# Patient Record
Sex: MALE | Race: WHITE | NOT HISPANIC OR LATINO | Employment: OTHER | URBAN - METROPOLITAN AREA
[De-identification: names, ages, dates, MRNs, and addresses within clinical notes are randomized per-mention and may not be internally consistent; named-entity substitution may affect disease eponyms.]

---

## 2019-11-01 ENCOUNTER — ANESTHESIA EVENT (OUTPATIENT)
Dept: GASTROENTEROLOGY | Facility: CLINIC | Age: 69
End: 2019-11-01
Payer: COMMERCIAL

## 2019-11-01 NOTE — ANESTHESIA PREPROCEDURE EVALUATION
Anesthesia Pre-Procedure Evaluation    Patient: Gildardo Almeida   MRN: [id number] : 1950          Preoperative Diagnosis: * No pre-op diagnosis entered *    Procedure(s):  COLONOSCOPY    No past medical history on file.  Past Surgical History:   Procedure Laterality Date     COLONOSCOPY N/A 2014    Procedure: COMBINED COLONOSCOPY, SINGLE BIOPSY/POLYPECTOMY BY BIOPSY;  Surgeon: Diego Lebron MD;  Location: [facility]       Anesthesia Evaluation     . Pt has had prior anesthetic. Type: MAC           ROS/MED HX    ENT/Pulmonary:     (+)tobacco use, , . .    Neurologic:  - neg neurologic ROS     Cardiovascular:  - neg cardiovascular ROS       METS/Exercise Tolerance:     Hematologic:  - neg hematologic  ROS       Musculoskeletal:  - neg musculoskeletal ROS       GI/Hepatic:     (+) GERD       Renal/Genitourinary:  - ROS Renal section negative       Endo:  - neg endo ROS       Psychiatric:  - neg psychiatric ROS       Infectious Disease:  - neg infectious disease ROS       Malignancy:   (+) Malignancy History of Skin  Skin CA Remission status post Surgery,         Other:    - neg other ROS                      Physical Exam  Normal systems: cardiovascular, pulmonary and dental    Airway   Mallampati: II  TM distance: >3 FB  Neck ROM: full    Dental     Cardiovascular       Pulmonary             No results found for: WBC, HGB, HCT, PLT, CRP, SED, NA, POTASSIUM, CHLORIDE, CO2, BUN, CR, GLC, WHIT, PHOS, MAG, ALBUMIN, PROTTOTAL, ALT, AST, GGT, ALKPHOS, BILITOTAL, BILIDIRECT, LIPASE, AMYLASE, LEXI, PTT, INR, FIBR, TSH, T4, T3, HCG, HCGS, CKTOTAL, CKMB, TROPN    Preop Vitals  BP Readings from Last 3 Encounters:   14 (!) 123/94    Pulse Readings from Last 3 Encounters:   No data found for Pulse      Resp Readings from Last 3 Encounters:   14 16    SpO2 Readings from Last 3 Encounters:   14 100%      Temp Readings from Last 1 Encounters:   14 36.1  C (96.9  F) (Tympanic)    Ht Readings from  Last 1 Encounters:   09/11/14 1.829 m (6')      Wt Readings from Last 1 Encounters:   09/11/14 93 kg (205 lb)    Estimated body mass index is 27.8 kg/m  as calculated from the following:    Height as of 9/11/14: 1.829 m (6').    Weight as of 9/11/14: 93 kg (205 lb).       Anesthesia Plan      History & Physical Review  History and physical reviewed and following examination; no interval change.    ASA Status:  2 .    NPO Status:  > 6 hours    Plan for General and MAC with Propofol induction. Maintenance will be TIVA.  Reason for MAC:  Deep or markedly invasive procedure (G8)  PONV prophylaxis:  Ondansetron (or other 5HT-3) and Dexamethasone or Solumedrol       Postoperative Care  Postoperative pain management:  Multi-modal analgesia.      Consents  Anesthetic plan, risks, benefits and alternatives discussed with:  Patient..                 IVORY [place] CRNA

## 2019-11-04 ENCOUNTER — HOSPITAL ENCOUNTER (OUTPATIENT)
Facility: CLINIC | Age: 69
Discharge: HOME | End: 2019-11-04
Attending: SURGERY | Admitting: SURGERY
Payer: COMMERCIAL

## 2019-11-04 ENCOUNTER — ANESTHESIA (OUTPATIENT)
Dept: GASTROENTEROLOGY | Facility: CLINIC | Age: 69
End: 2019-11-04
Payer: COMMERCIAL

## 2019-11-04 VITALS
RESPIRATION RATE: 16 BRPM | HEIGHT: 72 IN | BODY MASS INDEX: 27.77 KG/M2 | HEART RATE: 89 BPM | SYSTOLIC BLOOD PRESSURE: 110 MMHG | DIASTOLIC BLOOD PRESSURE: 74 MMHG | WEIGHT: 205 LBS | TEMPERATURE: 97.9 F | OXYGEN SATURATION: 97 %

## 2019-11-04 LAB — COLONOSCOPY: NORMAL

## 2019-11-04 PROCEDURE — 45385 COLONOSCOPY W/LESION REMOVAL: CPT | Mod: PT | Performed by: SURGERY

## 2019-11-04 PROCEDURE — 25000125 ZZHC RX 250: Performed by: SURGERY

## 2019-11-04 PROCEDURE — 37000008 ZZH ANESTHESIA TECHNICAL FEE, 1ST 30 MIN: Performed by: SURGERY

## 2019-11-04 PROCEDURE — 25000125 ZZHC RX 250: Performed by: NURSE ANESTHETIST, CERTIFIED REGISTERED

## 2019-11-04 PROCEDURE — 25800030 ZZH RX IP 258 OP 636: Performed by: SURGERY

## 2019-11-04 PROCEDURE — 45384 COLONOSCOPY W/LESION REMOVAL: CPT | Performed by: SURGERY

## 2019-11-04 PROCEDURE — 45385 COLONOSCOPY W/LESION REMOVAL: CPT | Performed by: SURGERY

## 2019-11-04 PROCEDURE — 88305 TISSUE EXAM BY PATHOLOGIST: CPT | Mod: 26 | Performed by: SURGERY

## 2019-11-04 PROCEDURE — 45384 COLONOSCOPY W/LESION REMOVAL: CPT | Mod: 59 | Performed by: SURGERY

## 2019-11-04 PROCEDURE — 88305 TISSUE EXAM BY PATHOLOGIST: CPT | Performed by: SURGERY

## 2019-11-04 PROCEDURE — 25000128 H RX IP 250 OP 636: Performed by: NURSE ANESTHETIST, CERTIFIED REGISTERED

## 2019-11-04 RX ORDER — LIDOCAINE HYDROCHLORIDE 10 MG/ML
INJECTION, SOLUTION INFILTRATION; PERINEURAL PRN
Status: DISCONTINUED | OUTPATIENT
Start: 2019-11-04 | End: 2019-11-04

## 2019-11-04 RX ORDER — PROPOFOL 10 MG/ML
INJECTION, EMULSION INTRAVENOUS CONTINUOUS PRN
Status: DISCONTINUED | OUTPATIENT
Start: 2019-11-04 | End: 2019-11-04

## 2019-11-04 RX ORDER — GLYCOPYRROLATE 0.2 MG/ML
INJECTION, SOLUTION INTRAMUSCULAR; INTRAVENOUS PRN
Status: DISCONTINUED | OUTPATIENT
Start: 2019-11-04 | End: 2019-11-04

## 2019-11-04 RX ORDER — LIDOCAINE 40 MG/G
CREAM TOPICAL
Status: DISCONTINUED | OUTPATIENT
Start: 2019-11-04 | End: 2019-11-04 | Stop reason: HOSPADM

## 2019-11-04 RX ORDER — SODIUM CHLORIDE, SODIUM LACTATE, POTASSIUM CHLORIDE, CALCIUM CHLORIDE 600; 310; 30; 20 MG/100ML; MG/100ML; MG/100ML; MG/100ML
INJECTION, SOLUTION INTRAVENOUS CONTINUOUS
Status: DISCONTINUED | OUTPATIENT
Start: 2019-11-04 | End: 2019-11-04 | Stop reason: HOSPADM

## 2019-11-04 RX ORDER — PROPOFOL 10 MG/ML
INJECTION, EMULSION INTRAVENOUS PRN
Status: DISCONTINUED | OUTPATIENT
Start: 2019-11-04 | End: 2019-11-04

## 2019-11-04 RX ORDER — ONDANSETRON 2 MG/ML
4 INJECTION INTRAMUSCULAR; INTRAVENOUS
Status: DISCONTINUED | OUTPATIENT
Start: 2019-11-04 | End: 2019-11-04 | Stop reason: HOSPADM

## 2019-11-04 RX ADMIN — SODIUM CHLORIDE, POTASSIUM CHLORIDE, SODIUM LACTATE AND CALCIUM CHLORIDE 1000 ML: 600; 310; 30; 20 INJECTION, SOLUTION INTRAVENOUS at 07:25

## 2019-11-04 RX ADMIN — GLYCOPYRROLATE 0.2 MG: 0.2 INJECTION, SOLUTION INTRAMUSCULAR; INTRAVENOUS at 08:02

## 2019-11-04 RX ADMIN — PROPOFOL 200 MCG/KG/MIN: 10 INJECTION, EMULSION INTRAVENOUS at 08:02

## 2019-11-04 RX ADMIN — PROPOFOL 100 MG: 10 INJECTION, EMULSION INTRAVENOUS at 08:02

## 2019-11-04 RX ADMIN — LIDOCAINE HYDROCHLORIDE 50 MG: 10 INJECTION, SOLUTION INFILTRATION; PERINEURAL at 08:02

## 2019-11-04 RX ADMIN — LIDOCAINE HYDROCHLORIDE 1 ML: 10 INJECTION, SOLUTION EPIDURAL; INFILTRATION; INTRACAUDAL; PERINEURAL at 07:25

## 2019-11-04 ASSESSMENT — MIFFLIN-ST. JEOR: SCORE: 1732.87

## 2019-11-04 ASSESSMENT — LIFESTYLE VARIABLES: TOBACCO_USE: 1

## 2019-11-04 NOTE — ANESTHESIA CARE TRANSFER NOTE
Patient: Gildardo Almeida    Procedure(s):  COLONOSCOPY, WITH LESION EXCISION USING HOT BIOPSY DEVICE    Diagnosis: * No pre-op diagnosis entered *  Diagnosis Additional Information: No value filed.    Anesthesia Type:   General, MAC     Note:  Airway :Nasal Cannula  Patient transferred to:Phase II  Handoff Report: Identifed the Patient, Identified the Reponsible Provider, Reviewed the pertinent medical history, Discussed the surgical course, Reviewed Intra-OP anesthesia mangement and issues during anesthesia, Set expectations for post-procedure period and Allowed opportunity for questions and acknowledgement of understanding      Vitals: (Last set prior to Anesthesia Care Transfer)    CRNA VITALS  11/4/2019 0746 - 11/4/2019 0818      11/4/2019             SpO2:  96 %                Electronically Signed By: IVORY Vanegas CRNA  November 4, 2019  8:18 AM

## 2019-11-04 NOTE — H&P
69 year old year old male here for colonoscopy for family history of colon cancer.    There is no problem list on file for this patient.      History reviewed. No pertinent past medical history.    Past Surgical History:   Procedure Laterality Date     COLONOSCOPY N/A 9/11/2014    Procedure: COMBINED COLONOSCOPY, SINGLE BIOPSY/POLYPECTOMY BY BIOPSY;  Surgeon: Diego Lebron MD;  Location: [facility]       @[facility] current outpatient medications on file.       No Known Allergies    Pt     Exam:  /84 (BP Location: Right arm)   Pulse 87   Temp 97.9  F (36.6  C) (Oral)   Resp 18   Ht 1.829 m (6')   Wt 93 kg (205 lb)   SpO2 98%   BMI 27.80 kg/m      Awake, Alert OX3  Lungs - CTA bilaterally  CV - RRR, no murmurs, distal pulses intact  Abd - soft, non-distended, non-tender, +BS  Extr - No cyanosis or edema    A/P 69 year old year old male in need of colonoscopy for family history of colon cancer. Risks, benefits, alternatives, and complications were discussed including the possibility of perforation and the patient agreed to proceed.    Jp Suresh MD

## 2019-11-04 NOTE — ANESTHESIA POSTPROCEDURE EVALUATION
Patient: Gildardo Almeida    Procedure(s):  COLONOSCOPY, WITH LESION EXCISION USING HOT BIOPSY DEVICE  Colonoscopy, Flexible, With Lesion Removal Using Snare    Diagnosis:* No pre-op diagnosis entered *  Diagnosis Additional Information: No value filed.    Anesthesia Type:  General, MAC    Note:  Anesthesia Post Evaluation    Patient location during evaluation: Phase 2  Patient participation: Able to fully participate in evaluation  Level of consciousness: awake and alert  Pain management: adequate  Airway patency: patent  Cardiovascular status: acceptable and hemodynamically stable  Respiratory status: acceptable and room air  Hydration status: acceptable  PONV: none     Anesthetic complications: None          Last vitals:  Vitals:    11/04/19 0702 11/04/19 0819   BP: 124/84 112/72   Pulse: 87 90   Resp: 18 (P) 16   Temp: 36.6  C (97.9  F)    SpO2: 98% 93%         Electronically Signed By: IVORY Vanegas CRNA  November 4, 2019  8:30 AM

## 2019-11-06 LAB — COPATH REPORT: NORMAL

## 2019-11-07 PROBLEM — D12.6 TUBULAR ADENOMA OF COLON: Status: ACTIVE | Noted: 2019-11-07

## 2023-04-07 ENCOUNTER — HOSPITAL ENCOUNTER (EMERGENCY)
Facility: CLINIC | Age: 73
Discharge: HOME | End: 2023-04-07
Attending: PHYSICIAN ASSISTANT | Admitting: PHYSICIAN ASSISTANT
Payer: COMMERCIAL

## 2023-04-07 ENCOUNTER — APPOINTMENT (OUTPATIENT)
Dept: GENERAL RADIOLOGY | Facility: CLINIC | Age: 73
End: 2023-04-07
Attending: PHYSICIAN ASSISTANT
Payer: COMMERCIAL

## 2023-04-07 VITALS
DIASTOLIC BLOOD PRESSURE: 66 MMHG | RESPIRATION RATE: 18 BRPM | TEMPERATURE: 97 F | SYSTOLIC BLOOD PRESSURE: 155 MMHG | HEART RATE: 80 BPM | OXYGEN SATURATION: 97 %

## 2023-04-07 DIAGNOSIS — M53.3 SACROILIAC JOINT PAIN: ICD-10-CM

## 2023-04-07 DIAGNOSIS — M54.50 ACUTE RIGHT-SIDED LOW BACK PAIN WITHOUT SCIATICA: ICD-10-CM

## 2023-04-07 PROCEDURE — 99203 OFFICE O/P NEW LOW 30 MIN: CPT | Performed by: PHYSICIAN ASSISTANT

## 2023-04-07 PROCEDURE — 72100 X-RAY EXAM L-S SPINE 2/3 VWS: CPT

## 2023-04-07 PROCEDURE — G0463 HOSPITAL OUTPT CLINIC VISIT: HCPCS | Performed by: PHYSICIAN ASSISTANT

## 2023-04-07 RX ORDER — GABAPENTIN 400 MG/1
400 CAPSULE ORAL 2 TIMES DAILY
COMMUNITY
Start: 2022-09-30

## 2023-04-07 RX ORDER — ATORVASTATIN CALCIUM 20 MG/1
1 TABLET, FILM COATED ORAL AT BEDTIME
COMMUNITY
Start: 2022-10-25

## 2023-04-07 RX ORDER — LIDOCAINE 4 G/G
1 PATCH TOPICAL EVERY 24 HOURS
Qty: 6 PATCH | Refills: 0 | Status: ON HOLD | OUTPATIENT
Start: 2023-04-07 | End: 2023-09-08

## 2023-04-07 ASSESSMENT — ENCOUNTER SYMPTOMS
FREQUENCY: 0
WEAKNESS: 0
NAUSEA: 0
VOMITING: 0
NEUROLOGICAL NEGATIVE: 1
EYES NEGATIVE: 1
CONSTITUTIONAL NEGATIVE: 1
DIZZINESS: 0
DYSURIA: 0
RESPIRATORY NEGATIVE: 1
SHORTNESS OF BREATH: 0
FEVER: 0
ANAL BLEEDING: 0
BACK PAIN: 1
CONSTIPATION: 0
HEMATURIA: 0
RECTAL PAIN: 0
CARDIOVASCULAR NEGATIVE: 1
NECK PAIN: 0
HEADACHES: 0
GASTROINTESTINAL NEGATIVE: 1
NUMBNESS: 0
BLOOD IN STOOL: 0
FLANK PAIN: 0
ABDOMINAL PAIN: 0
DIARRHEA: 0
COUGH: 0
NECK STIFFNESS: 0
ABDOMINAL DISTENTION: 0

## 2023-04-07 ASSESSMENT — ACTIVITIES OF DAILY LIVING (ADL): ADLS_ACUITY_SCORE: 35

## 2023-04-07 NOTE — ED TRIAGE NOTES
PT reports low right back pain x5 days, denies any known injury, denies any changes in urination.

## 2023-04-07 NOTE — DISCHARGE INSTRUCTIONS
Use medications as directed; do not apply ice or heat on top of lidocaine patch. Do not wear patch for more than 12 hours at a time.     Patient informed to follow up in clinic or with PCP in 5-7 days.   Apply heat and ice to back when lidocaine patch is not on, rest, Tylenol over the counter as discussed as long as no contraindications or allergies. Ibuprofen 400 mg twice daily with meals if you need this for break through pain up to 1 week.    Massage/gentle pressure to SI joint can help relieve pain.     Discussed with patient that if symptoms persist patient may need to see Physical Therapy.     Patient advised to go to Emergency Room if symptoms worsen, change, loss of bowel or bladder function or saddle anesthesia occur.     Patient voiced understanding of instructions given.

## 2023-04-07 NOTE — ED PROVIDER NOTES
History     Chief Complaint   Patient presents with     Back Pain     HPI  Gildardo Almeida is a 72 year old male who presents today with right lower back pain that started 5 days ago when he bent over to dry his feet. Patient states he felt a pain in the right lower back which has been present since. Patient denies any other injury.  Pain is present with movement but he is able to sleep comfortably at night when he is not moving.  No weakness in the lower extremities, numbness or tingling in lower extremities other than his normal neuropathy.  No saddle anesthesia or loss of bowel or bladder function.  He denies any rash, fevers, recent illness, abdominal pain, urinary symptoms, bloody or black tarry stools or constipation/diarrhea.  He has tried Tylenol over-the-counter with no significant improvement pain.    Allergies:  No Known Allergies    Problem List:    Patient Active Problem List    Diagnosis Date Noted     Tubular adenoma of colon 11/07/2019     Priority: Medium     Colon polyps 09/15/2014     Priority: Medium     Formatting of this note might be different from the original.  Noted Sept 2014 repeat recommended 2017  Small polyps 2019 repeat 2024 rec  colonoscoyp done       Lesion of ulnar nerve 06/01/2006     Priority: Medium        Past Medical History:    No past medical history on file.    Past Surgical History:    Past Surgical History:   Procedure Laterality Date     COLONOSCOPY N/A 9/11/2014    Procedure: COMBINED COLONOSCOPY, SINGLE BIOPSY/POLYPECTOMY BY BIOPSY;  Surgeon: Diego Lebron MD;  Location: [facility]       Family History:    No family history on file.    Social History:  Marital Status:   [2]        Medications:    atorvastatin (LIPITOR) 20 MG tablet  gabapentin (NEURONTIN) 400 MG capsule  Lidocaine (LIDOCARE) 4 % Patch  loratadine (CLARITIN) 10 MG tablet  OMEPRAZOLE PO          Review of Systems   Constitutional: Negative.  Negative for fever.   HENT: Negative.    Eyes:  Negative.    Respiratory: Negative.  Negative for cough and shortness of breath.    Cardiovascular: Negative.    Gastrointestinal: Negative.  Negative for abdominal distention, abdominal pain, anal bleeding, blood in stool, constipation, diarrhea, nausea, rectal pain and vomiting.   Genitourinary: Negative.  Negative for dysuria, flank pain, frequency, hematuria, penile pain, scrotal swelling, testicular pain and urgency.   Musculoskeletal: Positive for back pain. Negative for neck pain and neck stiffness.   Skin: Negative.  Negative for rash.   Neurological: Negative.  Negative for dizziness, weakness, numbness and headaches.   All other systems reviewed and are negative.      Physical Exam   BP: (!) 155/66  Pulse: 80  Temp: 97  F (36.1  C)  Resp: 18  SpO2: 97 %      Physical Exam  Vitals and nursing note reviewed.   Constitutional:       General: He is not in acute distress.     Appearance: Normal appearance. He is normal weight. He is not ill-appearing, toxic-appearing or diaphoretic.   HENT:      Head: Normocephalic.   Eyes:      General: No scleral icterus.     Extraocular Movements: Extraocular movements intact.      Conjunctiva/sclera: Conjunctivae normal.      Pupils: Pupils are equal, round, and reactive to light.   Cardiovascular:      Rate and Rhythm: Normal rate and regular rhythm.      Heart sounds: Normal heart sounds.   Pulmonary:      Effort: Pulmonary effort is normal.      Breath sounds: Normal breath sounds.   Musculoskeletal:         General: No swelling or signs of injury. Normal range of motion.      Cervical back: Normal, normal range of motion and neck supple. No rigidity or tenderness.      Thoracic back: Normal.      Lumbar back: Tenderness present. No swelling, edema, deformity, signs of trauma or lacerations. Normal range of motion. Negative right straight leg raise test and negative left straight leg raise test. No scoliosis.        Back:       Right lower leg: No edema.      Left lower  leg: No edema.      Comments: Positive tenderness with palpation over right SI joint.  Can reproduce pain with movement however patient does have full range of motion in the lower back.  Patient with full range of motion in legs. Negative straight leg raise bilaterally.  No pain with dorsiflexion of great toes bilaterally.  Patient neurovascularly intact.  Muscle strength 5 out of 5 to bilateral lower extremities.    Skin:     General: Skin is warm.      Capillary Refill: Capillary refill takes less than 2 seconds.      Findings: No bruising, erythema or rash.   Neurological:      General: No focal deficit present.      Mental Status: He is alert and oriented to person, place, and time.      Cranial Nerves: No cranial nerve deficit.      Sensory: No sensory deficit.      Motor: No weakness.      Gait: Gait normal.      Deep Tendon Reflexes: Reflexes normal.   Psychiatric:         Mood and Affect: Mood normal.         Behavior: Behavior normal.         Thought Content: Thought content normal.         Judgment: Judgment normal.         ED Course                 Procedures             Critical Care time:  none               Results for orders placed or performed during the hospital encounter of 04/07/23 (from the past 24 hour(s))   Lumbar spine XR, 2-3 views    Narrative    LUMBAR SPINE 2/3 VIEWS  4/7/2023 1:05 PM     HISTORY: Right lower back pain after bending 5 days ago.    COMPARISON: None available. Correlation is made with CT of the chest  6/16/2006.      Impression    IMPRESSION: Nomenclature is based on 5 lumbar vertebral bodies. No  gross lumbar vertebral body height loss is identified. Straightening  of the normal lumbar lordosis. Minimal dextroconvex curvature of the  lumbar spine. Mild to moderate disc space narrowing at L4-L5. Mild  disc space narrowing at L3-L4. There may be mild disc space narrowing  in the lower thoracic region. Multilevel marginal endplate  osteophytes, most pronounced at L4-L5. No  advanced facet arthropathy,  although there may be mild degenerative facet changes of the lumbar  spine. Unchanged calcified nodule measuring approximately 14 mm  projecting over the right lower lung zone. Mild atherosclerotic  calcifications of the aorta.       Medications - No data to display    Assessments & Plan (with Medical Decision Making)     I have reviewed the nursing notes.    I have reviewed the findings, diagnosis, plan and need for follow up with the patient.    Gildardo Almeida is a 72 year old male who presents today with right lower back pain that started 5 days ago when he bent over to dry his feet. Patient states he felt a pain in the right lower back which has been present since. Patient denies any other injury.  Pain is present with movement but he is able to sleep comfortably at night when he is not moving.  No weakness in the lower extremities, numbness or tingling in lower extremities other than his normal neuropathy.  No saddle anesthesia or loss of bowel or bladder function.  He denies any rash, fevers, recent illness, abdominal pain, urinary symptoms, bloody or black tarry stools or constipation/diarrhea.  He has tried Tylenol over-the-counter with no significant improvement pain.    Exam findings consistent with back strain/pull over the right SI joint.  Lidocaine patch prescription sent to the pharmacy for patient to use as directed.  Discussed only wearing lidocaine patch for 12 hours at a time to take it off for 12 hours prior to reapplying a new patch to prevent lidocaine toxicity.  No ice or heat over lidocaine patch.  Physical therapy referral placed.  X-ray to the lumbar spine obtained which shows wear-and-tear degeneration and disc space narrowing but nothing new or acute.  This was reviewed and discussed with patient.  Patient in agreement with plan and discharged in stable condition.  No concerns for acute cauda equina or need for emergent MRI imaging at this  time.        Discharge Medication List as of 4/7/2023  1:25 PM      START taking these medications    Details   Lidocaine (LIDOCARE) 4 % Patch Place 1 patch onto the skin every 24 hours To prevent lidocaine toxicity, patient should be patch free for 12 hrs daily.Disp-6 patch, [id number]             Final diagnoses:   Acute right-sided low back pain without sciatica   Sacroiliac joint pain       4/7/2023   [facility] EMERGENCY DEPT     Maki Whitt PA-C  04/07/23 8691

## 2023-04-17 ENCOUNTER — HOSPITAL ENCOUNTER (OUTPATIENT)
Dept: PHYSICAL THERAPY | Facility: CLINIC | Age: 73
Setting detail: THERAPIES SERIES
Discharge: HOME | End: 2023-04-17
Attending: PHYSICIAN ASSISTANT
Payer: COMMERCIAL

## 2023-04-17 DIAGNOSIS — M54.50 ACUTE RIGHT-SIDED LOW BACK PAIN WITHOUT SCIATICA: ICD-10-CM

## 2023-04-17 PROCEDURE — 97110 THERAPEUTIC EXERCISES: CPT | Mod: GP

## 2023-04-17 PROCEDURE — 97161 PT EVAL LOW COMPLEX 20 MIN: CPT | Mod: GP

## 2023-04-17 NOTE — PROGRESS NOTES
04/17/23 1000   General Information   Type of Visit Initial OP Ortho PT Evaluation   Start of Care Date 04/17/23   Referring Physician Maki Whitt, KIN   Patient/Family Goals Statement Learn exercises to do for the back; injury prevention   Orders Evaluate and Treat   Date of Order 04/07/23   Certification Required? Yes   Medical Diagnosis Acute right-sided low back pain without sciatica (M54.50)   Surgical/Medical history reviewed Yes   Precautions/Limitations no known precautions/limitations   Body Part(s)   Body Part(s) Lumbar Spine/SI   Presentation and Etiology   Pertinent history of current problem (include personal factors and/or comorbidities that impact the POC) Pt presents for new patient evaluation of low back pain. Has been recently seen at [facility] and at the [place] ED for concerns related to LBP. First was seen in the ER after back pain started 5 days prior with forward bending, then seen at [facility] medicine and prescribed a muscle relaxer and a course of prednisone. Pt had an XR of the lumbar spine as reviewed below. Was given lidocaine patches which did not help. Reports on 4/3/23, he got out of the shower, bent down to dry his legs with a towel, and felt immediate LBP which almost took him to the ground. Pt has had similar pain in the past which resolves after 2-3 days. States that the pain has gotten better after taking medications. Pt notes he had to use a cane for the first week after the injury but now is walking better as of the last few weeks. Pain has improved a lot as well but still limited in bending to the ground. Denies numbness, tingling, weakness or radicular pain. PMH includes neuropathy in both feet, elbow surgery, RC strain.   Impairments A. Pain;E. Decreased flexibility;H. Impaired gait   Functional Limitations perform activities of daily living   Symptom Location R low back   How/Where did it occur From insidious onset   Onset date of current episode/exacerbation  04/03/23   Chronicity New   Pain rating (0-10 point scale) Best (/10);Worst (/10)   Best (/10) 5   Worst (/10) 7   Pain quality C. Aching;B. Dull   Frequency of pain/symptoms A. Constant   Pain/symptoms are: Worse during the day   Pain/symptoms exacerbated by C. Lifting;D. Carrying;B. Walking;I. Bending;J. ADL;M. Other   Pain exacerbation comment Prolonged standing, vacuumming   Pain/symptoms eased by I. OTC medication(s);A. Sitting;C. Rest   Progression of symptoms since onset: Improved   Current / Previous Interventions   Diagnostic Tests: X-ray   X-ray Results Results   X-ray results IMPRESSION: Nomenclature is based on 5 lumbar vertebral bodies. No gross lumbar vertebral body height loss is identified. Straightening  of the normal lumbar lordosis. Minimal dextroconvex curvature of the  lumbar spine. Mild to moderate disc space narrowing at L4-L5. Mild disc space narrowing at L3-L4. There may be mild disc space narrowing  in the lower thoracic region. Multilevel marginal endplate osteophytes, most pronounced at L4-L5. No advanced facet arthropathy, although there may be mild degenerative facet changes of the lumbar  spine. Unchanged calcified nodule measuring approximately 14 mm  projecting over the right lower lung zone. Mild atherosclerotic  calcifications of the aorta.   Prior Level of Function   Functional Level Prior Comment Independent with ADLs and mobility   Current Level of Function   Patient role/employment history F. Retired   Fall Risk Screen   Fall screen completed by PT   Have you fallen 2 or more times in the past year? No   Have you fallen and had an injury in the past year? No   Is patient a fall risk? No   Abuse Screen (yes response referral indicated)   Feels Unsafe at Home or Work/School no   Feels Threatened by Someone no   Does Anyone Try to Keep You From Having Contact with Others or Doing Things Outside Your Home? no   Physical Signs of Abuse Present no   Lumbar Spine/SI Objective Findings    Gait/Locomotion Relatively unremarkable, no assistive devices   Balance/Proprioception (Single Leg Stance) Not assessed   Hamstring Flexibility Significant tension both sides   Obers Flexibility Mild tension both sides   Piriformis Flexibility Mild tension both sides   Flexion ROM 25% (severely limited and painful)   Extension ROM 75% (pain relieving)   Right Side Bending ROM 80%   Left Side Bending ROM 80%   Repeated Extension-Standing ROM Pain relieving   Repeated Flexion-Standing ROM Pain provoking   Lumbar ROM Comment Lumbar rotation WNL bilaterally   Hip Screen PROM: Flexion to 100* bilaterally and painful R side, IR limited both sides, ER full without pain   Hip Flexion (L2) Strength 5/5 B   Hip Abduction Strength 4+/5 B   Hip Extension Strength 5-/5 B   Knee Flexion Strength 5/5 B   Knee Extension (L3) Strength 5/5 B   Ankle Dorsiflexion (L4) Strength 5/5 B   Ankle Plantar Flexion (S1) Strength 5/5 B   SLR + R   Crossover SLR + (pain R low back w/L SLR)   Segmental Mobility Hypomobility with  to lumbar spine but non-painful   Palpation No significant TTP to lumbar paraspinals, R QL, R glutes/piriformis   Slump Test + R   Observation Pt is alert and oriented, no distress.   Integumentary WNL   Posture Slight reduction in lumbar lordotic curve   Planned Therapy Interventions   Planned Therapy Interventions gait training;joint mobilization;neuromuscular re-education;ROM;stretching;strengthening;manual therapy   Planned Modality Interventions   Planned Modality Interventions Biofeedback;Electrical stimulation;TENS;Traction;Ultrasound   Clinical Impression   Criteria for Skilled Therapeutic Interventions Met yes, treatment indicated   PT Diagnosis Low back pain   Influenced by the following impairments Impaired low back ROM, impaired hip ROM, neural tension, muscule imbalances   Functional limitations due to impairments Lifting, carrying, bending, walking, ADLs   Clinical Presentation Stable/Uncomplicated    Clinical Presentation Rationale Symptoms are stable   Clinical Decision Making (Complexity) Low complexity   Therapy Frequency other (see comments)  (Pt desiring initiation of home program only following visit today; advised he may return within 4 weeks for follow up if needed otherwise plan of care will be discharged.)   Predicted Duration of Therapy Intervention (days/wks) 4 weeks   Risk & Benefits of therapy have been explained Yes   Patient, Family & other staff in agreement with plan of care Yes   Clinical Impression Comments Pt presents for new patient evaluation of acute R sided low back pain. Pain began on 4/3/23 after a foward bend through the lumbar spine with unremarkable XR at that time. Pain has improved with time, rest and medications prescribed by PCP. History and exam findings today somewhat suggestive of disc etiology, though reassuring that pt is not experiencing radicular/peripheral symptoms. Given that symptoms are improving and are mild at this time, suspect if disc pathology is present is likely mild, and muscle strain may be more contributory. Pt desiring intiation of home program for self-management of symptoms without scheduled follow up at this time, which is reasonable given improvements in pain thus far. Will benefit from skilled PT services if symptoms worsen/recur to address impairments and functional limitations to return to [facility]. Prognosis for therapy is good.   Education Assessment   Preferred Learning Style Listening;Reading;Demonstration;Pictures/video   Barriers to Learning No barriers   ORTHO GOALS   PT Ortho Eval Goals 1;2;3;4   Ortho Goal 1   Goal Identifier 1   Goal Description Pt will demonstrate ability to bend forward at the waist to  an object < 5# without limitation due to back pain.   Target Date 05/15/23   Ortho Goal 2   Goal Identifier 2   Goal Description Pt will be able to independently ambulate 1000 feet or greater over uneven surfaces without limitation  due to back pain in order to improve community ambulation.   Target Date 05/15/23   Ortho Goal 3   Goal Identifier 3   Goal Description Pt will demonstrate ability to lift 20# from floor<>waist without limitation due to back pain to imrpove household tasks.   Target Date 05/15/23   Ortho Goal 4   Goal Identifier 4   Goal Description Pt will be independent with HEP for self-management of symptoms within 4 weeks.   Target Date 05/15/23   Total Evaluation Time   PT Eval, Low Complexity Minutes (37018) 15   Therapy Certification   Certification date from 04/17/23   Certification date to 05/15/23   Medical Diagnosis Acute right-sided low back pain without sciatica (M54.50)     Please contact me with any questions or concerns.   Thank you for this referral.     Kaitlyn Macedo, PT, DPT

## 2023-04-17 NOTE — PROGRESS NOTES
[facility]    OUTPATIENT PHYSICAL THERAPY ORTHOPEDIC EVALUATION  PLAN OF TREATMENT FOR OUTPATIENT REHABILITATION  (COMPLETE FOR INITIAL CLAIMS ONLY)  Patient's Last Name, First Name, M.I.  YOB: 1950  Gildardo Almeida    Provider s Name:  [facility]   Medical Record No.  [id number]   Start of Care Date:  04/17/23   Onset Date:  04/03/23   Type:     _X__PT   ___OT   ___SLP Medical Diagnosis:  Acute right-sided low back pain without sciatica (M54.50)     PT Diagnosis:  Low back pain   Visits from SOC:  1      _________________________________________________________________________________  Plan of Treatment/Functional Goals:  gait training, joint mobilization, neuromuscular re-education, ROM, stretching, strengthening, manual therapy     Biofeedback, Electrical stimulation, TENS, Traction, Ultrasound     Goals  Goal Identifier: 1  Goal Description: Pt will demonstrate ability to bend forward at the waist to  an object < 5# without limitation due to back pain.  Target Date: 05/15/23    Goal Identifier: 2  Goal Description: Pt will be able to independently ambulate 1000 feet or greater over uneven surfaces without limitation due to back pain in order to improve community ambulation.  Target Date: 05/15/23    Goal Identifier: 3  Goal Description: Pt will demonstrate ability to lift 20# from floor<>waist without limitation due to back pain to imrpove household tasks.  Target Date: 05/15/23    Goal Identifier: 4  Goal Description: Pt will be independent with HEP for self-management of symptoms within 4 weeks.  Target Date: 05/15/23                                                Therapy Frequency:  other (see comments) (Pt desiring initiation of home program only following visit today; advised he may return within 4 weeks for follow up if needed otherwise plan of  care will be discharged.)  Predicted Duration of Therapy Intervention:  4 weeks    Kaitlyn Macedo, PT                 I CERTIFY THE NEED FOR THESE SERVICES FURNISHED UNDER        THIS PLAN OF TREATMENT AND WHILE UNDER MY CARE     (Physician co-signature of this document indicates review and certification of the therapy plan).                       Certification Date From:  04/17/23   Certification Date To:  05/15/23    Referring Provider:  Maki Whitt PA-C    Initial Assessment        See Epic Evaluation Start of Care Date: 04/17/23

## 2023-05-01 NOTE — PROGRESS NOTES
[facility]    Outpatient Physical Therapy Discharge Note  Patient: Gildardo Almeida  : 1950    Beginning/End Dates of Reporting Period:  23 to 23    Referring Provider: Maki Whitt PA-C    Therapy Diagnosis: Low back pain     Client Self Report: Pt presents for new patient evaluation of LBP. Please see evaluation note.    Objective Measurements:  Observation Pt is alert and oriented, no distress.       Integumentary WNL       Posture Slight reduction in lumbar lordotic curve       Gait/Locomotion Relatively unremarkable, no assistive devices       Balance/Proprioception (Single Leg Stance) Not assessed       Flexion ROM 25% (severely limited and painful)       Extension ROM 75% (pain relieving)       Right Side Bending ROM 80%       Left Side Bending ROM 80%       Repeated Extension-Standing ROM Pain relieving       Repeated Flexion-Standing ROM Pain provoking       Lumbar ROM Comment Lumbar rotation WNL bilaterally       Hip Screen PROM: Flexion to 100* bilaterally and painful R side, IR limited both sides, ER full without pain       Hip Flexion (L2) Strength 5/5 B       Hip Abduction Strength 4+/5 B       Hip Extension Strength 5-/5 B       Knee Flexion Strength 5/5 B       Knee Extension (L3) Strength 5/5 B       Ankle Dorsiflexion (L4) Strength 5/5 B       Ankle Plantar Flexion (S1) Strength 5/5 B       Hamstring Flexibility Significant tension both sides       Obers Flexibility Mild tension both sides       Piriformis Flexibility Mild tension both sides       SLR + R       Crossover SLR + (pain R low back w/L SLR)       Slump Test + R       Segmental Mobility Hypomobility with  to lumbar spine but non-painful       Palpation No significant TTP to lumbar paraspinals, R QL, R glutes/piriformis           Goals:  Goal Identifier 1   Goal Description Pt will demonstrate ability to  bend forward at the waist to  an object < 5# without limitation due to back pain.   Target Date 05/15/23   Date Met      Progress (detail required for progress note):  Status unknown     Goal Identifier 2   Goal Description Pt will be able to independently ambulate 1000 feet or greater over uneven surfaces without limitation due to back pain in order to improve community ambulation.   Target Date 05/15/23   Date Met      Progress (detail required for progress note):  Status unknown     Goal Identifier 3   Goal Description Pt will demonstrate ability to lift 20# from floor<>waist without limitation due to back pain to imrpove household tasks.   Target Date 05/15/23   Date Met      Progress (detail required for progress note):  Status unknown     Goal Identifier 4   Goal Description Pt will be independent with HEP for self-management of symptoms within 4 weeks.   Target Date 05/15/23   Date Met   4/17/23   Progress (detail required for progress note):  MET     Plan:  Discharge from therapy.    Discharge:    Reason for Discharge: Patient has failed to schedule further appointments.    Discharge Plan: Patient to continue home program.    Please contact me with any questions or concerns.   Thank you for this referral.     Kaitlyn Macedo, PT, DPT

## 2023-08-30 ENCOUNTER — TELEPHONE (OUTPATIENT)
Dept: NURSING | Facility: CLINIC | Age: 73
End: 2023-08-30

## 2023-08-30 ENCOUNTER — HOSPITAL ENCOUNTER (EMERGENCY)
Facility: CLINIC | Age: 73
Discharge: HOME | End: 2023-08-30
Payer: COMMERCIAL

## 2023-08-30 VITALS
SYSTOLIC BLOOD PRESSURE: 137 MMHG | HEART RATE: 100 BPM | DIASTOLIC BLOOD PRESSURE: 63 MMHG | RESPIRATION RATE: 16 BRPM | OXYGEN SATURATION: 94 % | TEMPERATURE: 99.9 F

## 2023-08-30 DIAGNOSIS — J06.9 VIRAL URI WITH COUGH: ICD-10-CM

## 2023-08-30 LAB
FLUAV RNA SPEC QL NAA+PROBE: NEGATIVE
FLUBV RNA RESP QL NAA+PROBE: NEGATIVE
RSV RNA SPEC NAA+PROBE: NEGATIVE
SARS-COV-2 RNA RESP QL NAA+PROBE: POSITIVE

## 2023-08-30 PROCEDURE — 87637 SARSCOV2&INF A&B&RSV AMP PRB: CPT

## 2023-08-30 PROCEDURE — G0463 HOSPITAL OUTPT CLINIC VISIT: HCPCS

## 2023-08-30 PROCEDURE — 99214 OFFICE O/P EST MOD 30 MIN: CPT

## 2023-08-30 RX ORDER — BENZONATATE 200 MG/1
200 CAPSULE ORAL 3 TIMES DAILY PRN
Qty: 30 CAPSULE | Refills: 0 | Status: ON HOLD | OUTPATIENT
Start: 2023-08-30 | End: 2023-09-09

## 2023-08-30 RX ORDER — ALBUTEROL SULFATE 90 UG/1
2 AEROSOL, METERED RESPIRATORY (INHALATION) EVERY 6 HOURS PRN
Qty: 18 G | Refills: 0 | Status: SHIPPED | OUTPATIENT
Start: 2023-08-30

## 2023-08-30 ASSESSMENT — ACTIVITIES OF DAILY LIVING (ADL): ADLS_ACUITY_SCORE: 35

## 2023-08-30 NOTE — TELEPHONE ENCOUNTER
Coronavirus (COVID-19) Notification    Caller Name (Patient, parent, daughter/son, grandparent, etc)  Patient    Reason for call  Notify of Positive Coronavirus (COVID-19) lab results, assess symptoms,  review [facility] recommendations    Lab Result    Lab test:  2019-nCoV rRt-PCR or SARS-CoV-2 PCR    Oropharyngeal AND/OR nasopharyngeal swabs is POSITIVE for 2019-nCoV RNA/SARS-COV-2 PCR (COVID-19 virus)      Gather patient reported symptoms   Assessment   Current Symptoms at time of phone call, reported by patient: (if no symptoms, document: No symptoms] Cough, fatigue   Date of symptom(s) onset (if applicable) 8/26     If at time of call, Patients symptoms have worsened, the Patient should contact 911 or have someone drive them to Emergency Dept promptly:    If Patient calling 911, inform 911 personal that you have tested positive for the Coronavirus (COVID-19).  Place mask on and await 911 to arrive.  If Emergency Dept, If possible, please have another adult drive you to the Emergency Dept but you need to wear mask when in contact with other people.      Treatment Options:   Is patient interested in discussing COVID treatment? No.        Review information with Patient    Your result was positive. This means you have COVID-19 (coronavirus).    How can I protect others?    These guidelines are for isolating before returning to work, school or .    If you DO have symptoms  Stay home and away from others   For at least 5 days after your symptoms started, AND  You are fever free for 24 hours (with no medicine that reduces fever), AND  Your other symptoms are better  Wear a mask for 10 full days anytime you are around others    If you DON'T have symptoms  Stay home and away from others for at least 5 days after your positive test  Wear a mask for 10 full days anytime you are around others    There may be different guidelines for healthcare facilities.  Please check with the specific sites before  arriving.    If you have been told by a doctor that you were severely ill with COVID-19 or are immunocompromised, you should isolate for at least 10 days.    You should not go back to work until you meet the guidelines above for ending your home isolation. You don't need to be retested for COVID-19 before going back to work--studies show that you won't spread the virus if it's been at least 10 days since your symptoms started (or 20 days, if you have a weak immune system).    Employers, schools, and daycares: This is an official notice for this person's medical guidelines for returning in-person.  They must meet the above guidelines before going back to work, school or  in person.    You will receive a positive COVID-19 letter via [employer] or the mail soon with additional self-care information.    Would you like me to review some of that information with you now?  No    If you were tested for an upcoming procedure, please contact your provider for next steps.    Vinh Montes De Oca

## 2023-08-30 NOTE — ED NOTES
No data to display                Estimated body mass index is 27.8 kg/m  as calculated from the following:    Height as of 11/4/19: 1.829 m (6').    Weight as of 11/4/19: 93 kg (205 lb).     No results found for: GFRESTIMATED     FDA Facts Sheet  [facility]    MASBBP score of 4               Esteban Browning APRN CNP  08/30/23 2057

## 2023-08-30 NOTE — ED PROVIDER NOTES
History   No chief complaint on file.    HPI  Gildardo Almeida is a 73 year old male who Presents to urgent care for URI symptoms ongoing for the past 4 days.  Patient's primary concern today is his cough states cough has been so frequent and makes it difficult for him to eat and take deep breaths.  Reports the cough is occasionally productive.  He has felt fatigued and has had significant nasal congestion.  He denies noting any fevers at home.  He denies any significant shortness of breath, he has not had any chest pain, abdominal pain, nausea, vomiting, diarrhea, urinary symptoms.  He denies any sore throat or ear pain.  Patient is a daily smoker and typically smokes 1/2 pack daily.  Denies any other new concerns at this time.  He has not performed any COVID-19 testing at home.    Allergies:  No Known Allergies    Problem List:    Patient Active Problem List    Diagnosis Date Noted    Tubular adenoma of colon 11/07/2019     Priority: Medium    Colon polyps 09/15/2014     Priority: Medium     Formatting of this note might be different from the original.  Noted Sept 2014 repeat recommended 2017  Small polyps 2019 repeat 2024 rec  colonoscoyp done      Lesion of ulnar nerve 06/01/2006     Priority: Medium        Past Medical History:    No past medical history on file.    Past Surgical History:    Past Surgical History:   Procedure Laterality Date    COLONOSCOPY N/A 9/11/2014    Procedure: COMBINED COLONOSCOPY, SINGLE BIOPSY/POLYPECTOMY BY BIOPSY;  Surgeon: Diego Lebron MD;  Location: [facility]       Family History:    No family history on file.    Social History:  Marital Status:   [2]        Medications:    albuterol (PROAIR HFA/PROVENTIL HFA/VENTOLIN HFA) 108 (90 Base) MCG/ACT inhaler  benzonatate (TESSALON) 200 MG capsule  atorvastatin (LIPITOR) 20 MG tablet  gabapentin (NEURONTIN) 400 MG capsule  Lidocaine (LIDOCARE) 4 % Patch  loratadine (CLARITIN) 10 MG tablet  OMEPRAZOLE PO          Review of  Systems  See HPI  Physical Exam   BP: 137/63  Pulse: 100  Temp: 99.9  F (37.7  C)  Resp: 16  SpO2: 94 %      Physical Exam  Constitutional:       General: He is not in acute distress.     Appearance: Normal appearance. He is not toxic-appearing.   HENT:      Head: Normocephalic and atraumatic.      Right Ear: Tympanic membrane and ear canal normal.      Left Ear: Tympanic membrane and ear canal normal.      Nose: Congestion and rhinorrhea present.      Mouth/Throat:      Mouth: Mucous membranes are moist.      Pharynx: Oropharynx is clear. No oropharyngeal exudate or posterior oropharyngeal erythema.   Eyes:      General: No scleral icterus.     Conjunctiva/sclera: Conjunctivae normal.   Cardiovascular:      Rate and Rhythm: Normal rate and regular rhythm.      Heart sounds: Normal heart sounds. No murmur heard.  Pulmonary:      Effort: Pulmonary effort is normal. No respiratory distress.      Breath sounds: Examination of the right-middle field reveals wheezing. Examination of the left-middle field reveals wheezing. Examination of the right-lower field reveals wheezing. Examination of the left-lower field reveals wheezing. Wheezing and rhonchi present. No decreased breath sounds or rales.   Abdominal:      Palpations: Abdomen is soft.      Tenderness: There is no abdominal tenderness.   Musculoskeletal:         General: No deformity.      Cervical back: Neck supple.   Skin:     General: Skin is warm.      Capillary Refill: Capillary refill takes less than 2 seconds.   Neurological:      Mental Status: He is alert.         ED Course                 Procedures           No results found for this or any previous visit (from the past 24 hour(s)).    Medications - No data to display    Assessments & Plan (with Medical Decision Making)   Patient presented to urgent care for concern of URI-like symptoms.  He is afebrile on arrival and vital signs otherwise reassuring at this time.  He does not have any significant  shortness of breath.  He is noted to have some mild wheezing throughout.  No indication for chest x-ray at this time.  Patient was revealed to be positive for COVID-19 but had been discharged prior to the results.  Attempted to reach the patient twice to review his results and review potential for possible antiviral treatments.  Was able to review symptomatic treatments with the patient prior to his discharge including Tylenol and ibuprofen as needed for fevers and discomfort.  He was provided with a prescription for albuterol inhaler for cough wheezing, or shortness of breath.  Tessalon Perles as needed up to 3 times daily for symptomatic cough relief.  Patient was discharged in good condition and was agreeable to above plan.    I have reviewed the nursing notes.    I have reviewed the findings, diagnosis, plan and need for follow up with the patient.        New Prescriptions    ALBUTEROL (PROAIR HFA/PROVENTIL HFA/VENTOLIN HFA) 108 (90 BASE) MCG/ACT INHALER    Inhale 2 puffs into the lungs every 6 hours as needed for shortness of breath, wheezing or cough    BENZONATATE (TESSALON) 200 MG CAPSULE    Take 1 capsule (200 mg) by mouth 3 times daily as needed for cough       Final diagnoses:   Viral URI with cough       8/30/2023   [facility] EMERGENCY DEPT    Disclaimer:  This note consists of symbols derived from keyboarding, dictation and/or voice recognition software.  As a result, there may be errors in the script that have gone undetected.  Please consider this when interpreting information found in this chart.         Esteban Browning APRN CNP  08/30/23 2055

## 2023-08-30 NOTE — DISCHARGE INSTRUCTIONS
COVID test is pending, I will call you with the results when able later today.  Can use Tessalon Perles as needed for cough up to 3 times daily.  Can use albuterol inhaler as needed every 6-8 hours for cough, wheezing, or shortness of breath.  When for starting to use this medication, it can cause your heart to race for a short amount of time but this will go away on its own.    He is return for worsening symptoms or new concerns.

## 2023-09-07 ENCOUNTER — HOSPITAL ENCOUNTER (INPATIENT)
Facility: CLINIC | Age: 73
LOS: 1 days | Discharge: HOME | DRG: 177 | End: 2023-09-09
Attending: NURSE PRACTITIONER | Admitting: INTERNAL MEDICINE
Payer: COMMERCIAL

## 2023-09-07 ENCOUNTER — APPOINTMENT (OUTPATIENT)
Dept: CT IMAGING | Facility: CLINIC | Age: 73
DRG: 177 | End: 2023-09-07
Attending: NURSE PRACTITIONER
Payer: COMMERCIAL

## 2023-09-07 DIAGNOSIS — J12.82 PNEUMONIA DUE TO 2019 NOVEL CORONAVIRUS: ICD-10-CM

## 2023-09-07 DIAGNOSIS — U07.1 PNEUMONIA DUE TO 2019 NOVEL CORONAVIRUS: ICD-10-CM

## 2023-09-07 DIAGNOSIS — R06.02 SHORTNESS OF BREATH: ICD-10-CM

## 2023-09-07 DIAGNOSIS — I26.94 MULTIPLE SUBSEGMENTAL PULMONARY EMBOLI WITHOUT ACUTE COR PULMONALE (H): ICD-10-CM

## 2023-09-07 LAB
ALBUMIN SERPL BCG-MCNC: 3 G/DL (ref 3.5–5.2)
ALP SERPL-CCNC: 98 U/L (ref 40–129)
ALT SERPL W P-5'-P-CCNC: 49 U/L (ref 0–70)
ANION GAP SERPL CALCULATED.3IONS-SCNC: 12 MMOL/L (ref 7–15)
AST SERPL W P-5'-P-CCNC: 46 U/L (ref 0–45)
BASE EXCESS BLDV CALC-SCNC: 5.5 MMOL/L (ref -7.7–1.9)
BASOPHILS # BLD AUTO: 0.1 10E3/UL (ref 0–0.2)
BASOPHILS NFR BLD AUTO: 1 %
BILIRUB SERPL-MCNC: 0.8 MG/DL
BUN SERPL-MCNC: 13.8 MG/DL (ref 8–23)
CALCIUM SERPL-MCNC: 8.9 MG/DL (ref 8.8–10.2)
CHLORIDE SERPL-SCNC: 91 MMOL/L (ref 98–107)
CREAT SERPL-MCNC: 0.63 MG/DL (ref 0.67–1.17)
CRP SERPL-MCNC: 81.79 MG/L
D DIMER PPP FEU-MCNC: 5.95 UG/ML FEU (ref 0–0.5)
DEPRECATED HCO3 PLAS-SCNC: 26 MMOL/L (ref 22–29)
EGFRCR SERPLBLD CKD-EPI 2021: >90 ML/MIN/1.73M2
EOSINOPHIL # BLD AUTO: 0.2 10E3/UL (ref 0–0.7)
EOSINOPHIL NFR BLD AUTO: 2 %
ERYTHROCYTE [DISTWIDTH] IN BLOOD BY AUTOMATED COUNT: 13.1 % (ref 10–15)
GLUCOSE SERPL-MCNC: 100 MG/DL (ref 70–99)
HCO3 BLDV-SCNC: 30 MMOL/L (ref 21–28)
HCT VFR BLD AUTO: 37.5 % (ref 40–53)
HGB BLD-MCNC: 12.6 G/DL (ref 13.3–17.7)
HOLD SPECIMEN: NORMAL
IMM GRANULOCYTES # BLD: 0.2 10E3/UL
IMM GRANULOCYTES NFR BLD: 1 %
LACTATE SERPL-SCNC: 1 MMOL/L (ref 0.7–2)
LYMPHOCYTES # BLD AUTO: 1.9 10E3/UL (ref 0.8–5.3)
LYMPHOCYTES NFR BLD AUTO: 14 %
MCH RBC QN AUTO: 29.9 PG (ref 26.5–33)
MCHC RBC AUTO-ENTMCNC: 33.6 G/DL (ref 31.5–36.5)
MCV RBC AUTO: 89 FL (ref 78–100)
MONOCYTES # BLD AUTO: 0.7 10E3/UL (ref 0–1.3)
MONOCYTES NFR BLD AUTO: 5 %
NEUTROPHILS # BLD AUTO: 11 10E3/UL (ref 1.6–8.3)
NEUTROPHILS NFR BLD AUTO: 77 %
NRBC # BLD AUTO: 0 10E3/UL
NRBC BLD AUTO-RTO: 0 /100
NT-PROBNP SERPL-MCNC: 43 PG/ML (ref 0–900)
O2/TOTAL GAS SETTING VFR VENT: 28 %
PCO2 BLDV: 41 MM HG (ref 40–50)
PH BLDV: 7.47 [PH] (ref 7.32–7.43)
PLATELET # BLD AUTO: 371 10E3/UL (ref 150–450)
PO2 BLDV: 60 MM HG (ref 25–47)
POTASSIUM SERPL-SCNC: 3.6 MMOL/L (ref 3.4–5.3)
PROCALCITONIN SERPL IA-MCNC: 0.09 NG/ML
PROT SERPL-MCNC: 7.7 G/DL (ref 6.4–8.3)
RADIOLOGIST FLAGS: ABNORMAL
RBC # BLD AUTO: 4.21 10E6/UL (ref 4.4–5.9)
SODIUM SERPL-SCNC: 129 MMOL/L (ref 136–145)
TROPONIN T SERPL HS-MCNC: 60 NG/L
TROPONIN T SERPL HS-MCNC: 68 NG/L
WBC # BLD AUTO: 14.1 10E3/UL (ref 4–11)

## 2023-09-07 PROCEDURE — 99285 EMERGENCY DEPT VISIT HI MDM: CPT | Mod: 25 | Performed by: NURSE PRACTITIONER

## 2023-09-07 PROCEDURE — 82803 BLOOD GASES ANY COMBINATION: CPT | Performed by: NURSE PRACTITIONER

## 2023-09-07 PROCEDURE — 36415 COLL VENOUS BLD VENIPUNCTURE: CPT | Performed by: NURSE PRACTITIONER

## 2023-09-07 PROCEDURE — 93005 ELECTROCARDIOGRAM TRACING: CPT | Performed by: NURSE PRACTITIONER

## 2023-09-07 PROCEDURE — 250N000013 HC RX MED GY IP 250 OP 250 PS 637: Performed by: NURSE PRACTITIONER

## 2023-09-07 PROCEDURE — 250N000009 HC RX 250: Performed by: NURSE PRACTITIONER

## 2023-09-07 PROCEDURE — 84145 PROCALCITONIN (PCT): CPT | Performed by: NURSE PRACTITIONER

## 2023-09-07 PROCEDURE — 99223 1ST HOSP IP/OBS HIGH 75: CPT | Mod: AI

## 2023-09-07 PROCEDURE — G0378 HOSPITAL OBSERVATION PER HR: HCPCS

## 2023-09-07 PROCEDURE — 84484 ASSAY OF TROPONIN QUANT: CPT | Performed by: NURSE PRACTITIONER

## 2023-09-07 PROCEDURE — 85025 COMPLETE CBC W/AUTO DIFF WBC: CPT | Performed by: NURSE PRACTITIONER

## 2023-09-07 PROCEDURE — 250N000011 HC RX IP 250 OP 636: Performed by: NURSE PRACTITIONER

## 2023-09-07 PROCEDURE — 80053 COMPREHEN METABOLIC PANEL: CPT | Performed by: NURSE PRACTITIONER

## 2023-09-07 PROCEDURE — 250N000011 HC RX IP 250 OP 636: Mod: JZ

## 2023-09-07 PROCEDURE — 96374 THER/PROPH/DIAG INJ IV PUSH: CPT

## 2023-09-07 PROCEDURE — 250N000012 HC RX MED GY IP 250 OP 636 PS 637: Performed by: NURSE PRACTITIONER

## 2023-09-07 PROCEDURE — 71275 CT ANGIOGRAPHY CHEST: CPT

## 2023-09-07 PROCEDURE — 83605 ASSAY OF LACTIC ACID: CPT | Performed by: NURSE PRACTITIONER

## 2023-09-07 PROCEDURE — 85379 FIBRIN DEGRADATION QUANT: CPT | Performed by: NURSE PRACTITIONER

## 2023-09-07 PROCEDURE — 258N000003 HC RX IP 258 OP 636

## 2023-09-07 PROCEDURE — 86140 C-REACTIVE PROTEIN: CPT | Performed by: NURSE PRACTITIONER

## 2023-09-07 PROCEDURE — 93010 ELECTROCARDIOGRAM REPORT: CPT | Performed by: NURSE PRACTITIONER

## 2023-09-07 PROCEDURE — XW033E5 INTRODUCTION OF REMDESIVIR ANTI-INFECTIVE INTO PERIPHERAL VEIN, PERCUTANEOUS APPROACH, NEW TECHNOLOGY GROUP 5: ICD-10-PCS | Performed by: NURSE PRACTITIONER

## 2023-09-07 PROCEDURE — 250N000013 HC RX MED GY IP 250 OP 250 PS 637

## 2023-09-07 PROCEDURE — 83880 ASSAY OF NATRIURETIC PEPTIDE: CPT | Performed by: NURSE PRACTITIONER

## 2023-09-07 RX ORDER — PROCHLORPERAZINE MALEATE 5 MG
5 TABLET ORAL EVERY 6 HOURS PRN
Status: DISCONTINUED | OUTPATIENT
Start: 2023-09-07 | End: 2023-09-09 | Stop reason: HOSPADM

## 2023-09-07 RX ORDER — ACETAMINOPHEN 325 MG/1
650 TABLET ORAL EVERY 4 HOURS PRN
Status: DISCONTINUED | OUTPATIENT
Start: 2023-09-07 | End: 2023-09-09 | Stop reason: HOSPADM

## 2023-09-07 RX ORDER — PROCHLORPERAZINE 25 MG
12.5 SUPPOSITORY, RECTAL RECTAL EVERY 12 HOURS PRN
Status: DISCONTINUED | OUTPATIENT
Start: 2023-09-07 | End: 2023-09-09 | Stop reason: HOSPADM

## 2023-09-07 RX ORDER — ALBUTEROL SULFATE 90 UG/1
2 AEROSOL, METERED RESPIRATORY (INHALATION) EVERY 6 HOURS PRN
Status: DISCONTINUED | OUTPATIENT
Start: 2023-09-07 | End: 2023-09-09 | Stop reason: HOSPADM

## 2023-09-07 RX ORDER — PANTOPRAZOLE SODIUM 40 MG/1
40 TABLET, DELAYED RELEASE ORAL
Status: DISCONTINUED | OUTPATIENT
Start: 2023-09-08 | End: 2023-09-09 | Stop reason: HOSPADM

## 2023-09-07 RX ORDER — ONDANSETRON 4 MG/1
4 TABLET, ORALLY DISINTEGRATING ORAL EVERY 6 HOURS PRN
Status: DISCONTINUED | OUTPATIENT
Start: 2023-09-07 | End: 2023-09-09 | Stop reason: HOSPADM

## 2023-09-07 RX ORDER — ATORVASTATIN CALCIUM 20 MG/1
20 TABLET, FILM COATED ORAL AT BEDTIME
Status: DISCONTINUED | OUTPATIENT
Start: 2023-09-07 | End: 2023-09-09 | Stop reason: HOSPADM

## 2023-09-07 RX ORDER — ONDANSETRON 2 MG/ML
4 INJECTION INTRAMUSCULAR; INTRAVENOUS EVERY 6 HOURS PRN
Status: DISCONTINUED | OUTPATIENT
Start: 2023-09-07 | End: 2023-09-09 | Stop reason: HOSPADM

## 2023-09-07 RX ORDER — POLYETHYLENE GLYCOL 3350 17 G/17G
17 POWDER, FOR SOLUTION ORAL DAILY PRN
Status: DISCONTINUED | OUTPATIENT
Start: 2023-09-07 | End: 2023-09-09 | Stop reason: HOSPADM

## 2023-09-07 RX ORDER — IOPAMIDOL 755 MG/ML
86 INJECTION, SOLUTION INTRAVASCULAR ONCE
Status: COMPLETED | OUTPATIENT
Start: 2023-09-07 | End: 2023-09-07

## 2023-09-07 RX ORDER — GABAPENTIN 400 MG/1
400 CAPSULE ORAL AT BEDTIME
Status: DISCONTINUED | OUTPATIENT
Start: 2023-09-07 | End: 2023-09-09 | Stop reason: HOSPADM

## 2023-09-07 RX ORDER — BENZONATATE 100 MG/1
200 CAPSULE ORAL 3 TIMES DAILY PRN
Status: DISCONTINUED | OUTPATIENT
Start: 2023-09-07 | End: 2023-09-09 | Stop reason: HOSPADM

## 2023-09-07 RX ADMIN — IOPAMIDOL 86 ML: 755 INJECTION, SOLUTION INTRAVENOUS at 15:09

## 2023-09-07 RX ADMIN — SODIUM CHLORIDE 500 ML: 9 INJECTION, SOLUTION INTRAVENOUS at 22:18

## 2023-09-07 RX ADMIN — APIXABAN 10 MG: 5 TABLET, FILM COATED ORAL at 17:07

## 2023-09-07 RX ADMIN — SODIUM CHLORIDE 50 ML: 9 INJECTION, SOLUTION INTRAVENOUS at 21:26

## 2023-09-07 RX ADMIN — SODIUM CHLORIDE 100 ML: 9 INJECTION, SOLUTION INTRAVENOUS at 15:09

## 2023-09-07 RX ADMIN — ATORVASTATIN CALCIUM 20 MG: 20 TABLET, FILM COATED ORAL at 21:25

## 2023-09-07 RX ADMIN — GABAPENTIN 400 MG: 400 CAPSULE ORAL at 21:25

## 2023-09-07 RX ADMIN — DEXAMETHASONE 6 MG: 2 TABLET ORAL at 16:29

## 2023-09-07 RX ADMIN — REMDESIVIR 200 MG: 100 INJECTION, POWDER, LYOPHILIZED, FOR SOLUTION INTRAVENOUS at 21:24

## 2023-09-07 ASSESSMENT — ACTIVITIES OF DAILY LIVING (ADL)
ADLS_ACUITY_SCORE: 35
ADLS_ACUITY_SCORE: 31

## 2023-09-07 ASSESSMENT — ENCOUNTER SYMPTOMS
SHORTNESS OF BREATH: 1
MUSCULOSKELETAL NEGATIVE: 1
FATIGUE: 1
FEVER: 0
GASTROINTESTINAL NEGATIVE: 1
APPETITE CHANGE: 1
NEUROLOGICAL NEGATIVE: 1
COUGH: 1

## 2023-09-07 NOTE — PROGRESS NOTES
Accessing patient's chart for review d/t possible admission to Kae Noble RN [facility]

## 2023-09-07 NOTE — PROGRESS NOTES
[facility] ADMISSION NOTE    Patient admitted to room 2404 at approximately 1730 via wheel chair from emergency room. Patient was accompanied by spouse.     Verbal SBAR report received from ER RN Arlin prior to patient arrival.     Patient ambulated to bed independently. Patient alert and oriented X 3. The patient is not having any pain.  . Admission vital signs: Blood pressure 121/69, pulse 95, temperature 98  F (36.7  C), temperature source Oral, resp. rate 18, height 1.829 m (6'), weight 95.3 kg (210 lb), SpO2 93 %. Patient was oriented to plan of care, call light, bed controls, tv, telephone, bathroom, and visiting hours.     Risk Assessment    The following safety risks were identified during admission: none. Yellow risk band applied: NO.     Skin Initial Assessment    This writer admitted this patient and completed a full skin assessment and Vince score in the Adult PCS flowsheet. Appropriate interventions initiated as needed.     Secondary skin check completed by patient refused skin check stating he does not have any sores or rashes. Patient ambulates independently and well and patient is oriented x4.         Education    Patient has a [place] to Observation order: Yes  Observation education completed and documented: Yes, documented in 9/7 438pm RN note      Real Chew, RN

## 2023-09-07 NOTE — ED NOTES
Patient states he was seen in UC and tested positive for Covid 10 days ago. He has been very tired at home, not eating and has generalized weakness. He has a congested sounding cough. O2 sats 90-91% on room air supplemental oxygen applied and sats now 93-94%

## 2023-09-07 NOTE — ED PROVIDER NOTES
History     Chief Complaint   Patient presents with    Covid Concern     HPI  Gildardo Almeida is a 73 year old male who presents for evaluation of shortness of breath.  Is a positive for COVID-19 infection.  Today is day 11 of illness (symptoms started on 8/27/2023). Patient was seen here on 8/30/2023 in urgent care and tested positive for COVID-19.  He was having no respiratory distress and no indication at the time for chest imaging.  Over the last 5 to 6 days he has had increased shortness of breath.  Cough is intermittently productive.  Extremely fatigue. No appetite.  Altered taste.  Denies chest pain.  Denies nausea or vomiting.  Denies constipation or diarrhea.  Current everyday smoker, but has not been able to smoke the last 10 days.  He does drink alcohol, but none in the last 10 days.    He was prescribed a prescription for albuterol inhaler and Tessalon Perles when he was seen here on 8/30, but he has not been good about using these.    Allergies:  No Known Allergies    Problem List:    Patient Active Problem List    Diagnosis Date Noted    Shortness of breath 09/07/2023     Priority: Medium    Multiple subsegmental pulmonary emboli without acute cor pulmonale (H) 09/07/2023     Priority: Medium    Pneumonia due to 2019 novel coronavirus 09/07/2023     Priority: Medium    Tubular adenoma of colon 11/07/2019     Priority: Medium    Colon polyps 09/15/2014     Priority: Medium     Formatting of this note might be different from the original.  Noted Sept 2014 repeat recommended 2017  Small polyps 2019 repeat 2024 rec  colonoscoyp done      Lesion of ulnar nerve 06/01/2006     Priority: Medium        Past Medical History:    No past medical history on file.    Past Surgical History:    Past Surgical History:   Procedure Laterality Date    COLONOSCOPY N/A 9/11/2014    Procedure: COMBINED COLONOSCOPY, SINGLE BIOPSY/POLYPECTOMY BY BIOPSY;  Surgeon: Diego Lebron MD;  Location: [facility]  History:    No family history on file.    Social History:  Marital Status:   [2]        Medications:    albuterol (PROAIR HFA/PROVENTIL HFA/VENTOLIN HFA) 108 (90 Base) MCG/ACT inhaler  atorvastatin (LIPITOR) 20 MG tablet  benzonatate (TESSALON) 200 MG capsule  gabapentin (NEURONTIN) 400 MG capsule  Lidocaine (LIDOCARE) 4 % Patch  loratadine (CLARITIN) 10 MG tablet  OMEPRAZOLE PO          Review of Systems   Constitutional:  Positive for appetite change and fatigue. Negative for fever.   HENT:  Positive for congestion.    Respiratory:  Positive for cough and shortness of breath.    Cardiovascular:  Negative for chest pain.   Gastrointestinal: Negative.    Genitourinary: Negative.    Musculoskeletal: Negative.    Skin: Negative.    Neurological: Negative.    All other systems reviewed and are negative.      Physical Exam   BP: 105/70  Pulse: 95  Temp: 97.9  F (36.6  C)  Resp: 18  Height: 182.9 cm (6')  Weight: 95.3 kg (210 lb)  SpO2: 91 %      Physical Exam  Constitutional:       General: He is in acute distress.      Appearance: He is well-developed. He is ill-appearing.   HENT:      Head: Normocephalic and atraumatic.      Right Ear: External ear normal.      Left Ear: External ear normal.      Nose: Congestion present.      Mouth/Throat:      Mouth: Mucous membranes are moist.   Eyes:      Conjunctiva/sclera: Conjunctivae normal.   Cardiovascular:      Rate and Rhythm: Normal rate and regular rhythm.      Heart sounds: Normal heart sounds. No murmur heard.  Pulmonary:      Effort: Respiratory distress (SPO2 90% at rest,  nursing had placed oxygen at 2L per nasal canula. now 95% on oxygen) present. No tachypnea.      Breath sounds: Examination of the right-middle field reveals rales. Examination of the right-lower field reveals rales. Examination of the left-lower field reveals rales. Rales present.      Comments: Frequent congested sounding cough during exam.  Abdominal:      General: Bowel sounds are  normal. There is no distension.      Palpations: Abdomen is soft.      Tenderness: There is no abdominal tenderness.   Musculoskeletal:         General: Normal range of motion.   Skin:     General: Skin is warm and dry.      Findings: No rash.   Neurological:      General: No focal deficit present.      Mental Status: He is alert and oriented to person, place, and time.         ED Course              ED Course as of 09/07/23 1649   Thu Sep 07, 2023   1319 I had nursing turn oxygen off to see how low patient's SPO2  would drop. He had SPO2 as low as 84%. Patient placed back on oxygen 2L per NC.   1415 D-Dimer Quantitative(!): 5.95  Chest CT r/o PE has been ordered.   1419 At bedside.  I reviewed the lab findings thus far with patient and his wife.  Patient remains on oxygen at 2 L per nasal cannula, he has not had increased oxygen demands.  His SPO2 is 94% on the oxygen.   1456 Troponin T, High Sensitivity(!): 68  Patient awaiting to go to CT.  Serial 2 hour troponin will be rechecked at 3:30pm   1536 Radiology called, patient has a couple small segmental and subsegmental PEs in the lower and middle lobes. No right hear strain.    1559 At bedside.  We are still awaiting the second troponin.  I discussed the CT results with patient.  He is currently off of oxygen at this time and is 94% at rest on room air.  Denies chest pain.  Patient will be ambulated with pulse oximetry.   1604 Troponin T, High Sensitivity(!): 60  2nd Troponin went down.   1618 Patient walking some steps at the bedside. SPO2 91% on room air. We will continue to monitor is oximeter readings.  Plan for admission.   1630 I spoke with the on-call hospitalist, CHACHA Farr. Accepts patient for admission. I will get patient started on anticoagulation for his PEs (Eliquis ordered). Patient has received Dexamethsone 6 mg p.o.     Procedures              EKG Interpretation:      Interpreted by IVORY Urbina CNP and Dr. Debby Guerin  reviewed:1:34 pm   Symptoms at time of EKG: short of breath   Rhythm: Normal sinus   Rate: Normal  Axis: Normal  Ectopy: None  Conduction: Normal  ST Segments/ T Waves: No ST-T wave changes and No acute ischemic changes  Q Waves: None  Comparison to prior: No old EKG available    Clinical Impression: NSR with no ischemic changes.           Results for orders placed or performed during the hospital encounter of 09/07/23 (from the past 24 hour(s))   CBC with platelets differential    Narrative    The following orders were created for panel order CBC with platelets differential.  Procedure                               Abnormality         Status                     ---------                               -----------         ------                     CBC with platelets and d...[[id number]]  Abnormal            Final result                 Please view results for these tests on the individual orders.   Comprehensive metabolic panel   Result Value Ref Range    Sodium 129 (L) 136 - 145 mmol/L    Potassium 3.6 3.4 - 5.3 mmol/L    Chloride 91 (L) 98 - 107 mmol/L    Carbon Dioxide (CO2) 26 22 - 29 mmol/L    Anion Gap 12 7 - 15 mmol/L    Urea Nitrogen 13.8 8.0 - 23.0 mg/dL    Creatinine 0.63 (L) 0.67 - 1.17 mg/dL    Calcium 8.9 8.8 - 10.2 mg/dL    Glucose 100 (H) 70 - 99 mg/dL    Alkaline Phosphatase 98 40 - 129 U/L    AST 46 (H) 0 - 45 U/L    ALT 49 0 - 70 U/L    Protein Total 7.7 6.4 - 8.3 g/dL    Albumin 3.0 (L) 3.5 - 5.2 g/dL    Bilirubin Total 0.8 <=1.2 mg/dL    GFR Estimate >90 >60 mL/min/1.73m2   Troponin T, High Sensitivity   Result Value Ref Range    Troponin T, High Sensitivity 68 (H) <=22 ng/L   Lactic acid whole blood   Result Value Ref Range    Lactic Acid 1.0 0.7 - 2.0 mmol/L   D dimer quantitative   Result Value Ref Range    D-Dimer Quantitative 5.95 (H) 0.00 - 0.50 ug/mL FEU    Narrative    This D-dimer assay is intended for use in conjunction with a clinical pretest probability assessment model to exclude  pulmonary embolism (PE) and deep venous thrombosis (DVT) in outpatients suspected of PE or DVT. The cut-off value is 0.50 ug/mL FEU.    For patients 50 years of age or older, the application of age-adjusted cut-off values for D-Dimer may increase the specificity without significant effect on sensitivity. The literature suggested calculation age adjusted cut-off in ug/L = age in years x 10 ug/L. The results in this laboratory are reported as ug/mL rather than ug/L. The calculation for age adjusted cut off in ug/mL= age in years x 0.01 ug/mL. For example, the cut off for a 76 year old male is 76 x 0.01 ug/mL = 0.76 ug/mL (760 ug/L).    M Debi et al. Age adjusted D-dimer cut-off levels to rule out pulmonary embolism: The ADJUST-PE Study. ANASTACIO 2014;311:5376-5522.; HJ Jackie et al. Diagnostic accuracy of conventional or age adjusted D-dimer cutoff values in older patients with suspected venous thromboembolism. Systemic review and meta-analysis. BMJ 2013:346:f2492.   Blood gas venous   Result Value Ref Range    pH Venous 7.47 (H) 7.32 - 7.43    pCO2 Venous 41 40 - 50 mm Hg    pO2 Venous 60 (H) 25 - 47 mm Hg    Bicarbonate Venous 30 (H) 21 - 28 mmol/L    Base Excess/Deficit 5.5 (H) -7.7 - 1.9 mmol/L    FIO2 28    Procalcitonin   Result Value Ref Range    Procalcitonin 0.09 (H) <0.05 ng/mL   CRP inflammation   Result Value Ref Range    CRP Inflammation 81.79 (H) <5.00 mg/L   Nt probnp inpatient (BNP)   Result Value Ref Range    N terminal Pro BNP Inpatient 43 0 - 900 pg/mL   [place] Draw    Narrative    The following orders were created for panel order [place] Draw.  Procedure                               Abnormality         Status                     ---------                               -----------         ------                     Extra Red Top Tube[[id number]]                               Final result                 Please view results for these tests on the individual orders.   CBC with platelets and  differential   Result Value Ref Range    WBC Count 14.1 (H) 4.0 - 11.0 10e3/uL    RBC Count 4.21 (L) 4.40 - 5.90 10e6/uL    Hemoglobin 12.6 (L) 13.3 - 17.7 g/dL    Hematocrit 37.5 (L) 40.0 - 53.0 %    MCV 89 78 - 100 fL    MCH 29.9 26.5 - 33.0 pg    MCHC 33.6 31.5 - 36.5 g/dL    RDW 13.1 10.0 - 15.0 %    Platelet Count 371 150 - 450 10e3/uL    % Neutrophils 77 %    % Lymphocytes 14 %    % Monocytes 5 %    % Eosinophils 2 %    % Basophils 1 %    % Immature Granulocytes 1 %    NRBCs per 100 WBC 0 <1 /100    Absolute Neutrophils 11.0 (H) 1.6 - 8.3 10e3/uL    Absolute Lymphocytes 1.9 0.8 - 5.3 10e3/uL    Absolute Monocytes 0.7 0.0 - 1.3 10e3/uL    Absolute Eosinophils 0.2 0.0 - 0.7 10e3/uL    Absolute Basophils 0.1 0.0 - 0.2 10e3/uL    Absolute Immature Granulocytes 0.2 <=0.4 10e3/uL    Absolute NRBCs 0.0 10e3/uL   Extra Red Top Tube   Result Value Ref Range    Hold Specimen [facility]    CT Chest Pulmonary Embolism w Contrast   Result Value Ref Range    Radiologist flags Pulmonary embolism (AA)     Narrative    CT CHEST PULMONARY EMBOLISM W CONTRAST 9/7/2023 3:20 PM    CLINICAL HISTORY: short of breath, hypoxia, positive covid-19,  elevated d-dimer  TECHNIQUE: CT angiogram chest during arterial phase injection IV  contrast. 2D and 3D MIP reconstructions were performed by the CT  technologist. Dose reduction techniques were used.     CONTRAST: 86 mL Isovue 370    COMPARISON: CT 6/16/2006    FINDINGS:  ANGIOGRAM CHEST: Pulmonary arteries are normal caliber with small  volume segmental and subsegmental pulmonary emboli in the right middle  and lower lobes. Thoracic aorta is not well opacified and is  indeterminate for dissection. Aneurysmal dilation of the descending  thoracic aorta measuring up to 4.1 cm in diameter (series 4 image  117). No CT evidence of right heart strain.    LUNGS AND PLEURA: Upper lobe predominant paraseptal emphysema with  some superimposed peripheral interstitial opacities and consolidation,  some of  which have some likely traction bronchiectasis associated.  Large calcified granuloma in the right lower lobe. No significant  pleural effusion.    MEDIASTINUM/AXILLAE: Aneurysmal dilation of the descending thoracic  aorta as above. Calcified subcarinal and right hilar lymph nodes,  likely related to prior granulomatous disease. Mildly enlarged  noncalcified mediastinal lymph nodes, most likely reactive due to  adjacent inflammation, no specific follow-up recommended. Small  calcified right thyroid nodule, no specific follow-up recommended  given size.    CORONARY ARTERY CALCIFICATION: None.    UPPER ABDOMEN: Simple appearing right hepatic cyst, no specific  follow-up recommended. 1.6 cm right adrenal nodule.    MUSCULOSKELETAL: No acute bony abnormality.      Impression    IMPRESSION:  1.  Small volume segmental and subsegmental pulmonary emboli in the  right middle and lower lobes, without evidence of right heart strain.  2.  Mild emphysema with peripheral interstitial and alveolar opacities  and some associated traction bronchiectasis, findings likely represent  Covid pneumonia given recent positive test but superimposed fibrosis  is also possible. Consider pulmonology referral.  3.  Aneurysmal dilation of the descending thoracic aorta measuring up  to 4.1 cm.  4.  Incidental 1.6 cm right adrenal nodule, statistically represents  an adenoma, consider biochemical workup and follow-up imaging in one  year to document size stability.      [Critical Result: Pulmonary embolism]    Finding was identified on 9/7/2023 3:24 PM.     Dr. Harris was contacted by me on 9/7/2023 3:37 PM and verbalized  understanding of the critical result.     JORGE BULLOCK MD         SYSTEM ID:  [id number]   Troponin T, High Sensitivity   Result Value Ref Range    Troponin T, High Sensitivity 60 (H) <=22 ng/L       Medications   dexAMETHasone (DECADRON) tablet 6 mg (6 mg Oral $Given 9/7/23 4775)   apixaban ANTICOAGULANT (ELIQUIS) tablet 10 mg  (has no administration in time range)     Followed by   apixaban ANTICOAGULANT (ELIQUIS) tablet 5 mg (has no administration in time range)   iopamidol (ISOVUE-370) solution 86 mL (86 mLs Intravenous $Given 9/7/23 1509)   sodium chloride 0.9 % bag 500mL for CT scan flush use (100 mLs Intravenous $Given 9/7/23 1509)       Assessments & Plan (with Medical Decision Making)   73-year-old male who presents with shortness of breath, cough, and congestion that started 11 days ago.  He was seen in urgent care on 8/30 and tested positive for COVID-19.  His symptoms have worsened over the last couple days, increased work of breathing.  No chest pain.  Patient is a current everyday smoker.  Lung sounds with bilateral rales, worse on the right.  Frequent congested sounding cough.  He did have oximeter reading dropping to 84% shortly after his arrival here.  He was placed on 2 L of oxygen.  We were able to wean him down off the oxygen right now, with SPO2 91%.  He is afebrile.  Normotensive.  No tachycardia.  EKG is normal sinus rhythm with no ischemic changes.    WBC 14.1, normal lactic acid.  Procalcitonin 0.09, CRP 81.79.  Sodium is low at 129.  AST is mildly elevated at 46, the remainder of his LFTs are normal.  Serial troponin 68 and 60.  BNP is normal.  D-dimer elevated at 5.95    Chest CT (PE study) reveals small subsegmental and segmental PE in the right middle and lower lobes.  Notable emphysema and peripheral interstitial opacities and consolidation, consistent with COVID-pneumonia.  Incidental aneurysmal dilation of the descending thoracic aorta measuring up to 4.1 cm.  There is no evidence of right heart strain.    I discussed the above with patient and his wife.  I recommend he be admitted to the hospital for covid-19 pneumonia and PE (minimally overnight for monitoring and observation) given his age, comorbidity, shortness of breath, and borderline oximetry readings here.     Spoke with the on-call hospitalist, Sarahi  Jason, a who steps patient for admission.  Patient was given p.o. dexamethasone 6 mg and Eliquis 10 mg p.o.    I was not certain if he met criteria for remdesivir given this is day 11 of illness, and I deferred to the inpatient hospitalist. I suspect all his symptoms are from covid-19 virus and PE, and less inclined that this is bacterial pneumonia, his procal is not significantly elevated. I would consider antibiotics if he is worsening.       New Prescriptions    No medications on file       Final diagnoses:   Pneumonia due to 2019 novel coronavirus   Multiple subsegmental pulmonary emboli without acute cor pulmonale (H)   Shortness of breath       9/7/2023   [facility] EMERGENCY DEPT       Steven, Shaunna Willard, IVORY CNP  09/07/23 3035

## 2023-09-07 NOTE — H&P
"[facility]    History and Physical  Hospital Medicine       Date of Admission:  9/7/2023  Date of Service: 9/7/2023     Assessment & Plan   Gildardo Almeida is a 73 year old male who presents on 9/7/2023 with dyspnea. Found to have ongoing covid infection with new pulmonary embolism.     Dyspnea  Multiple subsegmental pulmonary emboli without acute cor pulmonale   Dyspnea since onset covid end of August, but worsening past few days PTA. CT PE reveals \" small volume segmental and subsegmental PE in right middle and lower lobes without evidence of heart strain\".  Patient initially requiring 1 L oxygen, weaned off at time of admission.  No history of blood clots.  In addition to COVID, patient endorses road trip with prolonged periods in the car about 2-3 weeks ago.  No LE edema, pain, erythema.  -Apixaban 10 mg twice daily x7 days, followed by 5 mg twice daily for PE tx  -Oxygen available as needed to maintain SPO2 >89%  -Continuous telemetry    Covid 19 infection  Presented to ER 8/30/2023 after about 1 week coughing and dyspnea.  COVID PCR test + 8/30/2023.  Discharged home with benzonatate and albuterol.  Was doing okay, but cough not resolving and dyspnea began worsening so presented to ER again.  Was on 1 L oxygen in ER, weaned off at time of admission.  Given persistent symptoms, oxygen requirement in ER, and COVID test positive within the past 14 days, patient qualifies for remdesivir and dexamethasone treatment.  Symptom Onset Around 8/23/23   Date of 1st Positive Test 8/30/23   Vaccination Status Vaccinated x3 (one booster)   -Dexamethasone 6 mg daily  -Remdesivir 5-day course  -COVID precautions (9/7 is day 8 COVID-positive, warrants isolation for at least 2 more days unless symptoms not yet improving then may qualify for longer isolation.)  -Oxygen available  -BMP, CBC, CRP in AM to trend  -Antitussive medications available    Hyponatremia  Moderate, 129 on presentation.  Likely " due to acute illness and COVID.  Anticipate improvement with adequate p.o. intake.  -500 cc normal saline bolus  -Encourage adequate p.o. intake  -BMP in a.m.    Leukocytosis  14.1. Inflammatory secondary to infection. Trend with CBC in AM.     Anemia  Mild, normocytic. 12.6 on admission. No signs of acute bleeding.  -CBC in a.m.    Neuropathic pain  Patient reports he takes gabapentin 400 mg at bedtime, continue.    Hyperlipidemia  Continue PTA atorvastatin 20 mg at bedtime    GERD  Continue PTA PPI.      Clinically Significant Risk Factors Present on Admission         # Hyponatremia: Lowest Na = 129 mmol/L in last 2 days, will monitor as appropriate      # Hypoalbuminemia: Lowest albumin = 3 g/dL at 9/7/2023  1:32 PM, will monitor as appropriate       # Acute Respiratory Failure: Documented O2 saturation < 91%.  Continue supplemental oxygen as needed     # Overweight: Estimated body mass index is 28.48 kg/m  as calculated from the following:    Height as of this encounter: 1.829 m (6').    Weight as of this encounter: 95.3 kg (210 lb).                 Diet: Regular Diet Adult    DVT Prophylaxis: DOAC  [facility] Catheter: Not present  Code Status:  chest compressions, no intubation (discussed at length the discrepancy with this code status but patient is adamant)  Lines: PIV, telemetry    Disposition Plan      Expected Discharge Date: 09/08/2023             Entered: Jessenia Gomez PA-C 09/07/2023, 5:34 PM     Status: Patient is appropriate for observation  Jessenia Gomez PA-C        The patient's care was discussed with the Attending Physician, Dr. Ludwin Godfrey, bedside RN, and the patient .    Primary Care Physician   Jourdan Reilly [phone]    History is obtained from the patient, who is a decent historian, handoff from ER provider, and review of old records via the EMR.    History of Present Illness   Gildardo Almeida is a 73 year old male with past medical history of colon polyps, colon  "adenoma now presents on 9/7/2023 with dyspnea.     Patient was diagnosed with PCR test in ER with covid 8/30/23.   Was feeling weak & dizzy with coughing & intermittent chest pain with coughing for 1 week prior to presenting to ER for test (symptoms began around 8/23/23).   [place] [facility] [place] [facility]. Says he could have picked up covid in any number of these locations they stopped in. Also endorses long stretches in the car during this trip.     Presented for worsening dyspnea & unresolving cough. At timeo f admission he denies chest pain, pressure, palpitations. No pleuritic pain. Has mild dyspnea but osygenating ok on room air. Denies hematemesis. Denies abdominal pain, nausea, vomiting, diarrhea. Denies fever or chills. Denies leg pain, swelling, tenderness. Endorses trace bilateral LE edema beginning AM of admission.     Vaccinated x3, last vaccine booster \"a year or two ago\".     Denies personal or known family history of blood clots.     Upon arrival to ER patient received lab and imaging workup. Initiated on apixaban & dexamethasone.     Review of Systems   Constitutional: denies weight loss  Eyes: denies changes in vision  HENT: denies changes in hearing  Respiratory: see HPI  Cardiovascular: denies  heart palpitations, lightheadedness, syncope  Gastroenterology: denies constipation, diarrhea, GERD symptoms  Genitourinary: denies dysuria  Integumentary: no new rashes or skin changes  Musculoskeletal: denies new muscle pain or joint trauma  Neuro: denies numbness, tingling, tremor  Psychiatric: denies significant changes to mood    Past Medical History     Tubular adenoma of colon 11/07/2019     Priority: Medium    Colon polyps 09/15/2014     Priority: Medium    Lesion of ulnar nerve 06/01/2006     Priority: Medium      Past Surgical History   Past Surgical History:   Procedure Laterality Date    COLONOSCOPY N/A 9/11/2014    Procedure: COMBINED " COLONOSCOPY, SINGLE BIOPSY/POLYPECTOMY BY BIOPSY;  Surgeon: Diego Lebron MD;  Location: [facility]        Prior to Admission Medications   Prior to Admission Medications   Prescriptions Last Dose Informant Patient Reported? Taking?   Lidocaine (LIDOCARE) 4 % Patch   No No   Sig: Place 1 patch onto the skin every 24 hours To prevent lidocaine toxicity, patient should be patch free for 12 hrs daily.   OMEPRAZOLE PO  Self Yes No   Sig: Take 20 mg by mouth every morning   albuterol (PROAIR HFA/PROVENTIL HFA/VENTOLIN HFA) 108 (90 Base) MCG/ACT inhaler   No No   Sig: Inhale 2 puffs into the lungs every 6 hours as needed for shortness of breath, wheezing or cough   atorvastatin (LIPITOR) 20 MG tablet   Yes No   Sig: Take 1 tablet by mouth At Bedtime   benzonatate (TESSALON) 200 MG capsule   No No   Sig: Take 1 capsule (200 mg) by mouth 3 times daily as needed for cough   gabapentin (NEURONTIN) 400 MG capsule   Yes No   Sig: Take 400 mg by mouth   loratadine (CLARITIN) 10 MG tablet  Self Yes No   Sig: Take 10 mg by mouth daily      Facility-Administered Medications: None     Allergies   No Known Allergies    Family History    No family history on file.    Social History   Social History     Socioeconomic History    Marital status:      Physical Exam   /71   Pulse 80   Temp 98  F (36.7  C)   Resp 16   Ht 1.829 m (6')   Wt 95.3 kg (210 lb)   SpO2 93%   BMI 28.48 kg/m       Weight: 210 lbs 0 oz Body mass index is 28.48 kg/m .     Constitutional: Alert, oriented, cooperative, no apparent distress, appears nontoxic  Eyes: Eyes are clear  HENT: Oropharynx is clear and moist. No evidence of cranial trauma.  Cardiovascular: Regular rate and rhythm, normal S1 and S2, and no murmur noted. Good peripheral pulses in wrists bilaterally. Trace pitting LE edema in ankles bilaterally. Tamiko sign negative.   Respiratory: Unlabored on room air with frequent coughing. Lung sounds course throughout with diminished but  present breath sounds in apex of right lung. No wheezing, rhonchi.   GI: Soft, non-tender, normal bowel sounds, nondistended.   Genitourinary: Deferred  Musculoskeletal: Normal muscle bulk, no obvious joint deformities.   Skin: Warm and dry, no rashes.   Neurologic: Neck supple.  is symmetric. Alert, oriented, interacting appropriately.     Data   Data reviewed today:   Recent Labs   Lab 09/07/23  1332   WBC 14.1*   HGB 12.6*   MCV 89      *   POTASSIUM 3.6   CHLORIDE 91*   CO2 26   BUN 13.8   CR 0.63*   ANIONGAP 12   WHIT 8.9   *   ALBUMIN 3.0*   PROTTOTAL 7.7   BILITOTAL 0.8   ALKPHOS 98   ALT 49   AST 46*       Recent Results (from the past 24 hour(s))   CT Chest Pulmonary Embolism w Contrast   Result Value    Radiologist flags Pulmonary embolism (AA)    Narrative    CT CHEST PULMONARY EMBOLISM W CONTRAST 9/7/2023 3:20 PM    CLINICAL HISTORY: short of breath, hypoxia, positive covid-19,  elevated d-dimer  TECHNIQUE: CT angiogram chest during arterial phase injection IV  contrast. 2D and 3D MIP reconstructions were performed by the CT  technologist. Dose reduction techniques were used.     CONTRAST: 86 mL Isovue 370    COMPARISON: CT 6/16/2006    FINDINGS:  ANGIOGRAM CHEST: Pulmonary arteries are normal caliber with small  volume segmental and subsegmental pulmonary emboli in the right middle  and lower lobes. Thoracic aorta is not well opacified and is  indeterminate for dissection. Aneurysmal dilation of the descending  thoracic aorta measuring up to 4.1 cm in diameter (series 4 image  117). No CT evidence of right heart strain.    LUNGS AND PLEURA: Upper lobe predominant paraseptal emphysema with  some superimposed peripheral interstitial opacities and consolidation,  some of which have some likely traction bronchiectasis associated.  Large calcified granuloma in the right lower lobe. No significant  pleural effusion.    MEDIASTINUM/AXILLAE: Aneurysmal dilation of the descending  thoracic  aorta as above. Calcified subcarinal and right hilar lymph nodes,  likely related to prior granulomatous disease. Mildly enlarged  noncalcified mediastinal lymph nodes, most likely reactive due to  adjacent inflammation, no specific follow-up recommended. Small  calcified right thyroid nodule, no specific follow-up recommended  given size.    CORONARY ARTERY CALCIFICATION: None.    UPPER ABDOMEN: Simple appearing right hepatic cyst, no specific  follow-up recommended. 1.6 cm right adrenal nodule.    MUSCULOSKELETAL: No acute bony abnormality.      Impression    IMPRESSION:  1.  Small volume segmental and subsegmental pulmonary emboli in the  right middle and lower lobes, without evidence of right heart strain.  2.  Mild emphysema with peripheral interstitial and alveolar opacities  and some associated traction bronchiectasis, findings likely represent  Covid pneumonia given recent positive test but superimposed fibrosis  is also possible. Consider pulmonology referral.  3.  Aneurysmal dilation of the descending thoracic aorta measuring up  to 4.1 cm.  4.  Incidental 1.6 cm right adrenal nodule, statistically represents  an adenoma, consider biochemical workup and follow-up imaging in one  year to document size stability.      [Critical Result: Pulmonary embolism]    Finding was identified on 9/7/2023 3:24 PM.     Dr. Harris was contacted by me on 9/7/2023 3:37 PM and verbalized  understanding of the critical result.     JORGE BULLOCK MD         SYSTEM ID:  [id number]

## 2023-09-07 NOTE — ED NOTES
[facility]– [place][note 2]   Admission Handoff    The patient is Gildardo Almeida, 73 year old who arrived in the ED by CAR from home with a complaint of Covid Concern  . The patient's current symptoms are a recurrence of a past episode and during this time the symptoms have increased. In the ED, patient was diagnosed with   Final diagnoses:   Pneumonia due to 2019 novel coronavirus   Multiple subsegmental pulmonary emboli without acute cor pulmonale (H)   Shortness of breath         Needed?: No    Allergies:  No Known Allergies    Past Medical Hx: No past medical history on file.    Initial vitals were: BP: 105/70  Pulse: 95  Temp: 97.9  F (36.6  C)  Resp: 18  Height: 182.9 cm (6')  Weight: 95.3 kg (210 lb)  SpO2: 91 %   Recent vital Signs: /71   Pulse 86   Temp 97.9  F (36.6  C) (Tympanic)   Resp 18   Ht 1.829 m (6')   Wt 95.3 kg (210 lb)   SpO2 91%   BMI 28.48 kg/m      Elimination Status: Continent: Yes     Activity Level: SBA    Fall Status: Reason for falls risk:  Mobility  nonskid shoes/slippers when out of bed, arm band in place, and patient and family education    Baseline Mental status: WDL  Current Mental Status changes: at basesline    Infection present or suspected this encounter: yes respiratory  Sepsis suspected: No    Isolation type: respiratory    Bariatric equipment needed?: No    In the ED these meds were given:   Medications   dexAMETHasone (DECADRON) tablet 6 mg (6 mg Oral $Given 9/7/23 1629)   apixaban ANTICOAGULANT (ELIQUIS) tablet 10 mg (has no administration in time range)     Followed by   apixaban ANTICOAGULANT (ELIQUIS) tablet 5 mg (has no administration in time range)   iopamidol (ISOVUE-370) solution 86 mL (86 mLs Intravenous $Given 9/7/23 1509)   sodium chloride 0.9 % bag 500mL for CT scan flush use (100 mLs Intravenous $Given 9/7/23 1509)       Drips running?  No    Home pump  No    Current LDAs: Right arm 20 ga  none     Results:   Labs/Imaging   Ordered and Resulted from Time of ED Arrival Up to the Time of Departure from the ED  Results for orders placed or performed during the hospital encounter of 09/07/23 (from the past 24 hour(s))   CBC with platelets differential    Narrative    The following orders were created for panel order CBC with platelets differential.  Procedure                               Abnormality         Status                     ---------                               -----------         ------                     CBC with platelets and d...[[id number]]  Abnormal            Final result                 Please view results for these tests on the individual orders.   Comprehensive metabolic panel   Result Value Ref Range    Sodium 129 (L) 136 - 145 mmol/L    Potassium 3.6 3.4 - 5.3 mmol/L    Chloride 91 (L) 98 - 107 mmol/L    Carbon Dioxide (CO2) 26 22 - 29 mmol/L    Anion Gap 12 7 - 15 mmol/L    Urea Nitrogen 13.8 8.0 - 23.0 mg/dL    Creatinine 0.63 (L) 0.67 - 1.17 mg/dL    Calcium 8.9 8.8 - 10.2 mg/dL    Glucose 100 (H) 70 - 99 mg/dL    Alkaline Phosphatase 98 40 - 129 U/L    AST 46 (H) 0 - 45 U/L    ALT 49 0 - 70 U/L    Protein Total 7.7 6.4 - 8.3 g/dL    Albumin 3.0 (L) 3.5 - 5.2 g/dL    Bilirubin Total 0.8 <=1.2 mg/dL    GFR Estimate >90 >60 mL/min/1.73m2   Troponin T, High Sensitivity   Result Value Ref Range    Troponin T, High Sensitivity 68 (H) <=22 ng/L   Lactic acid whole blood   Result Value Ref Range    Lactic Acid 1.0 0.7 - 2.0 mmol/L   D dimer quantitative   Result Value Ref Range    D-Dimer Quantitative 5.95 (H) 0.00 - 0.50 ug/mL FEU    Narrative    This D-dimer assay is intended for use in conjunction with a clinical pretest probability assessment model to exclude pulmonary embolism (PE) and deep venous thrombosis (DVT) in outpatients suspected of PE or DVT. The cut-off value is 0.50 ug/mL FEU.    For patients 50 years of age or older, the application of age-adjusted cut-off values for D-Dimer may increase the  specificity without significant effect on sensitivity. The literature suggested calculation age adjusted cut-off in ug/L = age in years x 10 ug/L. The results in this laboratory are reported as ug/mL rather than ug/L. The calculation for age adjusted cut off in ug/mL= age in years x 0.01 ug/mL. For example, the cut off for a 76 year old male is 76 x 0.01 ug/mL = 0.76 ug/mL (760 ug/L).    M Debi et al. Age adjusted D-dimer cut-off levels to rule out pulmonary embolism: The ADJUST-PE Study. ANASTACIO 2014;311:2925-0801.; HJ Jackie et al. Diagnostic accuracy of conventional or age adjusted D-dimer cutoff values in older patients with suspected venous thromboembolism. Systemic review and meta-analysis. BMJ 2013:346:f2492.   Blood gas venous   Result Value Ref Range    pH Venous 7.47 (H) 7.32 - 7.43    pCO2 Venous 41 40 - 50 mm Hg    pO2 Venous 60 (H) 25 - 47 mm Hg    Bicarbonate Venous 30 (H) 21 - 28 mmol/L    Base Excess/Deficit 5.5 (H) -7.7 - 1.9 mmol/L    FIO2 28    Procalcitonin   Result Value Ref Range    Procalcitonin 0.09 (H) <0.05 ng/mL   CRP inflammation   Result Value Ref Range    CRP Inflammation 81.79 (H) <5.00 mg/L   Nt probnp inpatient (BNP)   Result Value Ref Range    N terminal Pro BNP Inpatient 43 0 - 900 pg/mL   [facility] Draw    Narrative    The following orders were created for panel order [facility] Draw.  Procedure                               Abnormality         Status                     ---------                               -----------         ------                     Extra Red Top Tube[[id number]]                               Final result                 Please view results for these tests on the individual orders.   CBC with platelets and differential   Result Value Ref Range    WBC Count 14.1 (H) 4.0 - 11.0 10e3/uL    RBC Count 4.21 (L) 4.40 - 5.90 10e6/uL    Hemoglobin 12.6 (L) 13.3 - 17.7 g/dL    Hematocrit 37.5 (L) 40.0 - 53.0 %    MCV 89 78 - 100 fL    MCH 29.9 26.5 - 33.0 pg    MCHC 33.6  31.5 - 36.5 g/dL    RDW 13.1 10.0 - 15.0 %    Platelet Count 371 150 - 450 10e3/uL    % Neutrophils 77 %    % Lymphocytes 14 %    % Monocytes 5 %    % Eosinophils 2 %    % Basophils 1 %    % Immature Granulocytes 1 %    NRBCs per 100 WBC 0 <1 /100    Absolute Neutrophils 11.0 (H) 1.6 - 8.3 10e3/uL    Absolute Lymphocytes 1.9 0.8 - 5.3 10e3/uL    Absolute Monocytes 0.7 0.0 - 1.3 10e3/uL    Absolute Eosinophils 0.2 0.0 - 0.7 10e3/uL    Absolute Basophils 0.1 0.0 - 0.2 10e3/uL    Absolute Immature Granulocytes 0.2 <=0.4 10e3/uL    Absolute NRBCs 0.0 10e3/uL   Extra Red Top Tube   Result Value Ref Range    Hold Specimen JIC    CT Chest Pulmonary Embolism w Contrast   Result Value Ref Range    Radiologist flags Pulmonary embolism (AA)     Narrative    CT CHEST PULMONARY EMBOLISM W CONTRAST 9/7/2023 3:20 PM    CLINICAL HISTORY: short of breath, hypoxia, positive covid-19,  elevated d-dimer  TECHNIQUE: CT angiogram chest during arterial phase injection IV  contrast. 2D and 3D MIP reconstructions were performed by the CT  technologist. Dose reduction techniques were used.     CONTRAST: 86 mL Isovue 370    COMPARISON: CT 6/16/2006    FINDINGS:  ANGIOGRAM CHEST: Pulmonary arteries are normal caliber with small  volume segmental and subsegmental pulmonary emboli in the right middle  and lower lobes. Thoracic aorta is not well opacified and is  indeterminate for dissection. Aneurysmal dilation of the descending  thoracic aorta measuring up to 4.1 cm in diameter (series 4 image  117). No CT evidence of right heart strain.    LUNGS AND PLEURA: Upper lobe predominant paraseptal emphysema with  some superimposed peripheral interstitial opacities and consolidation,  some of which have some likely traction bronchiectasis associated.  Large calcified granuloma in the right lower lobe. No significant  pleural effusion.    MEDIASTINUM/AXILLAE: Aneurysmal dilation of the descending thoracic  aorta as above. Calcified subcarinal and right  hilar lymph nodes,  likely related to prior granulomatous disease. Mildly enlarged  noncalcified mediastinal lymph nodes, most likely reactive due to  adjacent inflammation, no specific follow-up recommended. Small  calcified right thyroid nodule, no specific follow-up recommended  given size.    CORONARY ARTERY CALCIFICATION: None.    UPPER ABDOMEN: Simple appearing right hepatic cyst, no specific  follow-up recommended. 1.6 cm right adrenal nodule.    MUSCULOSKELETAL: No acute bony abnormality.      Impression    IMPRESSION:  1.  Small volume segmental and subsegmental pulmonary emboli in the  right middle and lower lobes, without evidence of right heart strain.  2.  Mild emphysema with peripheral interstitial and alveolar opacities  and some associated traction bronchiectasis, findings likely represent  Covid pneumonia given recent positive test but superimposed fibrosis  is also possible. Consider pulmonology referral.  3.  Aneurysmal dilation of the descending thoracic aorta measuring up  to 4.1 cm.  4.  Incidental 1.6 cm right adrenal nodule, statistically represents  an adenoma, consider biochemical workup and follow-up imaging in one  year to document size stability.      [Critical Result: Pulmonary embolism]    Finding was identified on 9/7/2023 3:24 PM.     Dr. Harris was contacted by me on 9/7/2023 3:37 PM and verbalized  understanding of the critical result.     JORGE BULLOCK MD         SYSTEM ID:  [id number]   Troponin T, High Sensitivity   Result Value Ref Range    Troponin T, High Sensitivity 60 (H) <=22 ng/L       For the majority of the shift this patient's behavior was Green     Cardiac Rhythm: Normal Sinus  Pt needs tele? Yes  Skin/wound Issues: None    Code Status: Full Code    Pain control: good    Nausea control: pt had none    Abnormal labs/tests/findings requiring intervention: see labs      Patient tested for COVID 19 prior to admission: YES     OBS brochure/video discussed/provided to  patient/family: Yes     Family present during ED course? Yes     Family Comments/Social Situation comments: none    Tasks needing completion: None    Arlin Martínez RN

## 2023-09-07 NOTE — ED TRIAGE NOTES
Pt tested positive Covid about 10 days ago and continues to feel sob and not getting any better.        Triage Assessment       Row Name 09/07/23 1210       Respiratory WDL    Respiratory WDL X;cough

## 2023-09-08 ENCOUNTER — APPOINTMENT (OUTPATIENT)
Dept: GENERAL RADIOLOGY | Facility: CLINIC | Age: 73
DRG: 177 | End: 2023-09-08
Attending: INTERNAL MEDICINE
Payer: COMMERCIAL

## 2023-09-08 LAB
ALBUMIN SERPL BCG-MCNC: 2.8 G/DL (ref 3.5–5.2)
ALP SERPL-CCNC: 97 U/L (ref 40–129)
ALT SERPL W P-5'-P-CCNC: 44 U/L (ref 0–70)
ANION GAP SERPL CALCULATED.3IONS-SCNC: 11 MMOL/L (ref 7–15)
AST SERPL W P-5'-P-CCNC: 40 U/L (ref 0–45)
BILIRUB SERPL-MCNC: 0.4 MG/DL
BUN SERPL-MCNC: 13 MG/DL (ref 8–23)
CALCIUM SERPL-MCNC: 8.6 MG/DL (ref 8.8–10.2)
CHLORIDE SERPL-SCNC: 95 MMOL/L (ref 98–107)
CREAT SERPL-MCNC: 0.57 MG/DL (ref 0.67–1.17)
CRP SERPL-MCNC: 78.79 MG/L
DEPRECATED HCO3 PLAS-SCNC: 26 MMOL/L (ref 22–29)
EGFRCR SERPLBLD CKD-EPI 2021: >90 ML/MIN/1.73M2
ERYTHROCYTE [DISTWIDTH] IN BLOOD BY AUTOMATED COUNT: 13.2 % (ref 10–15)
GLUCOSE SERPL-MCNC: 120 MG/DL (ref 70–99)
HCT VFR BLD AUTO: 36.5 % (ref 40–53)
HGB BLD-MCNC: 12.1 G/DL (ref 13.3–17.7)
MCH RBC QN AUTO: 29.9 PG (ref 26.5–33)
MCHC RBC AUTO-ENTMCNC: 33.2 G/DL (ref 31.5–36.5)
MCV RBC AUTO: 90 FL (ref 78–100)
PLATELET # BLD AUTO: 367 10E3/UL (ref 150–450)
POTASSIUM SERPL-SCNC: 4 MMOL/L (ref 3.4–5.3)
PROT SERPL-MCNC: 7.2 G/DL (ref 6.4–8.3)
RBC # BLD AUTO: 4.05 10E6/UL (ref 4.4–5.9)
SODIUM SERPL-SCNC: 132 MMOL/L (ref 136–145)
WBC # BLD AUTO: 8.7 10E3/UL (ref 4–11)

## 2023-09-08 PROCEDURE — 85014 HEMATOCRIT: CPT

## 2023-09-08 PROCEDURE — 80053 COMPREHEN METABOLIC PANEL: CPT

## 2023-09-08 PROCEDURE — 86140 C-REACTIVE PROTEIN: CPT

## 2023-09-08 PROCEDURE — 250N000011 HC RX IP 250 OP 636: Mod: JZ

## 2023-09-08 PROCEDURE — 99233 SBSQ HOSP IP/OBS HIGH 50: CPT | Performed by: STUDENT IN AN ORGANIZED HEALTH CARE EDUCATION/TRAINING PROGRAM

## 2023-09-08 PROCEDURE — 250N000012 HC RX MED GY IP 250 OP 636 PS 637

## 2023-09-08 PROCEDURE — 250N000013 HC RX MED GY IP 250 OP 250 PS 637: Performed by: INTERNAL MEDICINE

## 2023-09-08 PROCEDURE — 36415 COLL VENOUS BLD VENIPUNCTURE: CPT

## 2023-09-08 PROCEDURE — 120N000001 HC R&B MED SURG/OB

## 2023-09-08 PROCEDURE — 258N000003 HC RX IP 258 OP 636

## 2023-09-08 PROCEDURE — 71045 X-RAY EXAM CHEST 1 VIEW: CPT

## 2023-09-08 PROCEDURE — G0378 HOSPITAL OBSERVATION PER HR: HCPCS

## 2023-09-08 PROCEDURE — 250N000013 HC RX MED GY IP 250 OP 250 PS 637

## 2023-09-08 RX ORDER — PYRIDOXINE HCL (VITAMIN B6) 50 MG
1 TABLET ORAL DAILY
COMMUNITY

## 2023-09-08 RX ADMIN — DEXAMETHASONE 6 MG: 2 TABLET ORAL at 08:48

## 2023-09-08 RX ADMIN — GABAPENTIN 400 MG: 400 CAPSULE ORAL at 22:37

## 2023-09-08 RX ADMIN — ATORVASTATIN CALCIUM 20 MG: 20 TABLET, FILM COATED ORAL at 22:37

## 2023-09-08 RX ADMIN — SODIUM CHLORIDE 50 ML: 9 INJECTION, SOLUTION INTRAVENOUS at 22:26

## 2023-09-08 RX ADMIN — APIXABAN 10 MG: 5 TABLET, FILM COATED ORAL at 08:48

## 2023-09-08 RX ADMIN — PANTOPRAZOLE SODIUM 40 MG: 40 TABLET, DELAYED RELEASE ORAL at 08:48

## 2023-09-08 RX ADMIN — APIXABAN 10 MG: 5 TABLET, FILM COATED ORAL at 17:33

## 2023-09-08 RX ADMIN — REMDESIVIR 100 MG: 100 INJECTION, POWDER, LYOPHILIZED, FOR SOLUTION INTRAVENOUS at 21:21

## 2023-09-08 ASSESSMENT — ACTIVITIES OF DAILY LIVING (ADL)
ADLS_ACUITY_SCORE: 31
TOILETING_ISSUES: NO
ADLS_ACUITY_SCORE: 31
ADLS_ACUITY_SCORE: 31
ADLS_ACUITY_SCORE: 20
ADLS_ACUITY_SCORE: 31
ADLS_ACUITY_SCORE: 31
DRESSING/BATHING_DIFFICULTY: NO
DOING_ERRANDS_INDEPENDENTLY_DIFFICULTY: NO
ADLS_ACUITY_SCORE: 31
CHANGE_IN_FUNCTIONAL_STATUS_SINCE_ONSET_OF_CURRENT_ILLNESS/INJURY: NO
WEAR_GLASSES_OR_BLIND: YES
ADLS_ACUITY_SCORE: 31
WALKING_OR_CLIMBING_STAIRS_DIFFICULTY: NO
ADLS_ACUITY_SCORE: 31
VISION_MANAGEMENT: WEARS GLASSES
CONCENTRATING,_REMEMBERING_OR_MAKING_DECISIONS_DIFFICULTY: NO
ADLS_ACUITY_SCORE: 31
FALL_HISTORY_WITHIN_LAST_SIX_MONTHS: NO
DIFFICULTY_EATING/SWALLOWING: NO
ADLS_ACUITY_SCORE: 31
ADLS_ACUITY_SCORE: 31

## 2023-09-08 NOTE — PROGRESS NOTES
Covid precautions maintained. Patient continues to experience a cough. Patient IND. Patient utilizing oxygen to maintain oxygen saturations. Patient denied dyspnea.

## 2023-09-08 NOTE — PROGRESS NOTES
[facility]    Medicine Progress Note - Hospitalist Service    Date of Admission:  9/7/2023    Assessment & Plan    Gildardo Almeida is a 73 year old male with past medical history of colon polyps, colon adenoma now presents on 9/7/2023 with dyspnea.     Acute hypoxic respiratory failure  COVID-19 infection  Pulmonary embolism multi segmental  -Started on apixaban  -Dexamethasone and remdesivir  -Treatment oxygen to keep saturation above 90 to 93%    Hyponatremia   -This post IV fluid proving likely from volume depletion    Leukocytosis-improved    Chronic anemia  -Globin remained stable we will continue anticoagulation    Neuropathic pain  Patient reports he takes gabapentin 400 mg at bedtime, continue.     Hyperlipidemia  Continue PTA atorvastatin 20 mg at bedtime     GERD  Continue PTA PPI.       Diet: Regular Diet Adult    DVT Prophylaxis: DOAC  Solis Catheter: Not present  Lines: None     Cardiac Monitoring: ACTIVE order. Indication: acute PE  Code Status:  full     Clinically Significant Risk Factors Present on Admission         # Hyponatremia: Lowest Na = 129 mmol/L in last 2 days, will monitor as appropriate      # Hypoalbuminemia: Lowest albumin = 2.8 g/dL at 9/8/2023  5:08 AM, will monitor as appropriate          # Overweight: Estimated body mass index is 28.48 kg/m  as calculated from the following:    Height as of this encounter: 1.829 m (6').    Weight as of this encounter: 95.3 kg (210 lb).              Disposition Plan      Expected Discharge Date: 09/10/2023                  John Danielson MD  Hospitalist Service  [facility]  Securely message with [employer] (more info)  Text page via [employer] Paging/Directory   ______________________________________________________________________    Interval History   Pertinent overnight events  -patient's shortness of breath significantly improving denies any fever chills or rigors    Physical Exam   Vital Signs: Temp:  98  F (36.7  C) Temp src: Oral BP: 129/78 Pulse: 80   Resp: 16 SpO2: 95 % O2 Device: None (Room air) Oxygen Delivery: 1.5 LPM  Weight: 210 lbs 0 oz    Constitutional on O2 supplement but comfortable   Eyes: Eyes are clear  HENT: Oropharynx is clear and moist. No evidence of cranial trauma.  Cardiovascular: Regular rate and rhythm, normal S1 and S2, and no murmur noted. Good peripheral pulses in wrists bilaterally. Trace pitting LE edema in ankles bilaterally. Tamiko sign negative.   Respiratory: Unlabored on room air with frequent coughing. Lung sounds course throughout with diminished but present breath sounds in apex of right lung. No wheezing, rhonchi.   GI: Soft, non-tender, normal bowel sounds, nondistended.   Genitourinary: Deferred  Musculoskeletal: Normal muscle bulk, no obvious joint deformities.   Skin: Warm and dry, no rashes.   Neurologic: Neck supple.  is symmetric. Alert, oriented, interacting appropriately.     Medical Decision Making       52  MINUTES SPENT BY ME on the date of service doing chart review, history, exam, documentation & further activities per the note.      Data     I have personally reviewed the following data over the past 24 hrs:    8.7  \   12.1 (L)   / 367     132 (L) 95 (L) 13.0 /  120 (H)   4.0 26 0.57 (L) \     ALT: 44 AST: 40 AP: 97 TBILI: 0.4   ALB: 2.8 (L) TOT PROTEIN: 7.2 LIPASE: N/A     Procal: N/A CRP: 78.79 (H) Lactic Acid: N/A

## 2023-09-08 NOTE — PROGRESS NOTES
Situation: Patient admitted for multiple pulmonary emboli      Subjective/Objective: CoVid positive, confirmed more than 10 days ago, no precautions     /69   Pulse 95   Temp 98  F (36.7  C) (Oral)   Resp 18   Ht 1.829 m (6')   Wt 95.3 kg (210 lb)   SpO2 93%   BMI 28.48 kg/m         Neuro: A&Ox4       Cardiac: WNL       Respiratory: 89-95% on room air, reports SOB, oxygen saturations decrease with exertion but recover very quickly when at rest       GI/: Continent of urine, Continent of bowel, Independent with toilet ing and hygiene       Mobility: Independent in room       Skin: Patient refused skin check stating he has no sores. Patient is oriented x 4. Patient is ambulatory and strong, moves around easily.       LDAs: IV saline locked     Pain: Denies pain       Misc: Pleasant calm cooperative yan Noble RN [facility]

## 2023-09-08 NOTE — PROVIDER NOTIFICATION
09/08/23 1300   Oxygen Therapy   SpO2 96 %   O2 Device Nasal cannula   Oxygen Delivery 1.5 LPM     Patient able to pull IS up to 750ml. Patient given pulse oximeter to use on discharge. Covid discharge instructions placed in AVS for patient to have at time of discharge.

## 2023-09-08 NOTE — MEDICATION SCRIBE - ADMISSION MEDICATION HISTORY
Medication Scribe Admission Medication History    Admission medication history is complete. The information provided in this note is only as accurate as the sources available at the time of the update.    Medication reconciliation/reorder completed by provider prior to medication history? Yes    Information Source(s): Patient via phone    Pertinent Information:     Changes made to PTA medication list:  Added: vit B12  Deleted: lidocaine patch  Changed: gabapentin every day to BID    Medication Affordability:  Not including over the counter (OTC) medications, was there a time in the past 3 months when you did not take your medications as prescribed because of cost?: No    Allergies reviewed with patient and updates made in EHR: yes    Medication History Completed By: Ann Marie Sue 9/8/2023 12:50 PM    Prior to Admission medications    Medication Sig Last Dose Taking? Auth Provider Long Term End Date   albuterol (PROAIR HFA/PROVENTIL HFA/VENTOLIN HFA) 108 (90 Base) MCG/ACT inhaler Inhale 2 puffs into the lungs every 6 hours as needed for shortness of breath, wheezing or cough 9/6/2023 at unknown Yes Esteban Browning APRN CNP Yes    atorvastatin (LIPITOR) 20 MG tablet Take 1 tablet by mouth At Bedtime 9/6/2023 at hs Yes Reported, Patient Yes    benzonatate (TESSALON) 200 MG capsule Take 1 capsule (200 mg) by mouth 3 times daily as needed for cough 9/7/2023 at am Yes Esteban Browning APRN CNP     Cyanocobalamin (B-12) 50 MCG TABS Take 1 tablet by mouth daily 9/7/2023 at am Yes Reported, Patient     gabapentin (NEURONTIN) 400 MG capsule Take 400 mg by mouth 9/7/2023 at am Yes Reported, Patient Yes    loratadine (CLARITIN) 10 MG tablet Take 10 mg by mouth daily 9/7/2023 at am Yes Reported, Patient     OMEPRAZOLE PO Take 20 mg by mouth every morning Past Week at unknown Yes Reported, Patient

## 2023-09-08 NOTE — PROGRESS NOTES
Time: Assumed care of patient at 2300    Reason, date of admission: SOB on 9/7/23, found to have ongoing Covid infection with new PE per provider's note.  OBS    Admitted from: ED, pt presented by car from home    Isolation? Yes    Activity: IND    Neuro: Alert and oriented    Cardiac: WNL      Telemetry: Yes    O2: Yes, 4L    GI/:  WNL           Skin: Refused full body skin check    Lines/Drains: Right lower forearm    Fluids: SL     Pain: Denied    Plan: Pending

## 2023-09-08 NOTE — PLAN OF CARE
Goal Outcome Evaluation:  Assumed care [id number]      Neuro: A&O x4  Cardiac: WNL, on tele  Respiratory: Diminished, infrequent productive cough. On 4L NC  GI/: WNL  Diet/appetite: Regular  Activity: Independent  Pain: Denies  Skin: Refused skin check  LDA's:  IV SL     Plan: Pending oxygen improvement

## 2023-09-08 NOTE — PROGRESS NOTES
Patient alert and oriented. Patient IND in room, encouraged patient to call for assistance as needed. Patient frequently coughing. Patient is continent.

## 2023-09-08 NOTE — UTILIZATION REVIEW
"Admission Status; Secondary Review Determination     Admission Date: 9/7/2023 12:20 PM       Under the authority of the Utilization Management Committee, the utilization review process indicated a secondary review on the above patient.  The review outcome is based on review of the medical records, discussions with staff, and applying clinical experience noted on the date of the review.        (x)      Inpatient Status Appropriate - This patient's medical care is consistent with medical management for inpatient care and reasonable inpatient medical practice.       RATIONALE FOR DETERMINATION      Brief clinical presentation, information copied from the chart, abbreviated and edited for relevant content:     Admitted OBS, meets IP per [facility]  Paged team to advance to IP.    Gildardo Almeida is a 73 year old male who presents on 9/7/2023 with dyspnea. Found to have ongoing covid infection with new pulmonary embolism. CT PE reveals \" small volume segmental and subsegmental PE in right middle and lower lobes without evidence of heart strain\".  Patient initially requiring 1 L oxygen now on 4L today. No history of blood clots.  In addition to COVID, patient endorses road trip with prolonged periods in the car about 2-3 weeks ago. Apixaban 10 mg twice daily x7 days, followed by 5 mg twice daily for PE tx. Initial COVID PCR test + 8/30/2023.  Discharged home with benzonatate and albuterol. Was doing okay, but cough not resolving and dyspnea began worsening so presented to ER again.  Given persistent symptoms, oxygen requirement in ER, and COVID test positive within the past 14 days, patient qualifies for remdesivir and dexamethasone treatment. Also with hyponatremia, on IVF. Following labs.           At the time of admission with the information available to the attending physician, more than 2 nights hospital complex care was anticipated. Also, there was a risk of adverse outcome if patient was treated outpatient or " observation. High intensity of services anticipated. Inpatient admission appropriate based on Medicare guidelines.       The information on this document is developed by the utilization review team in order for the business office to ensure compliance.  This only denotes the appropriateness of proper admission status and does not reflect the quality of care rendered.         The definitions of Inpatient Status and Observation Status used in making the determination above are those provided in the CMS Coverage Manual, Chapter 1 and Chapter 6, section 70.4.      Sincerely,      Marely Martinez MD   Utilization Review/ Case Management  [facility].

## 2023-09-08 NOTE — PROGRESS NOTES
Covid Precautions initiated.  Patient is on a continuous pulse oximeter with oxygen sat of 91% on room air. Productive cough, up independently in room.  Remdesivir given, Normal Saline bolus 500 ml over 4 hours.  .

## 2023-09-09 VITALS
SYSTOLIC BLOOD PRESSURE: 129 MMHG | WEIGHT: 200.18 LBS | RESPIRATION RATE: 16 BRPM | OXYGEN SATURATION: 94 % | HEIGHT: 72 IN | HEART RATE: 80 BPM | DIASTOLIC BLOOD PRESSURE: 64 MMHG | BODY MASS INDEX: 27.11 KG/M2 | TEMPERATURE: 97.4 F

## 2023-09-09 LAB
ALBUMIN SERPL BCG-MCNC: 2.9 G/DL (ref 3.5–5.2)
ALP SERPL-CCNC: 84 U/L (ref 40–129)
ALT SERPL W P-5'-P-CCNC: 46 U/L (ref 0–70)
ANION GAP SERPL CALCULATED.3IONS-SCNC: 9 MMOL/L (ref 7–15)
AST SERPL W P-5'-P-CCNC: 43 U/L (ref 0–45)
BASOPHILS # BLD AUTO: 0 10E3/UL (ref 0–0.2)
BASOPHILS NFR BLD AUTO: 0 %
BILIRUB SERPL-MCNC: 0.4 MG/DL
BUN SERPL-MCNC: 14.8 MG/DL (ref 8–23)
CALCIUM SERPL-MCNC: 8.5 MG/DL (ref 8.8–10.2)
CHLORIDE SERPL-SCNC: 99 MMOL/L (ref 98–107)
CREAT SERPL-MCNC: 0.61 MG/DL (ref 0.67–1.17)
DEPRECATED HCO3 PLAS-SCNC: 28 MMOL/L (ref 22–29)
EGFRCR SERPLBLD CKD-EPI 2021: >90 ML/MIN/1.73M2
EOSINOPHIL # BLD AUTO: 0.1 10E3/UL (ref 0–0.7)
EOSINOPHIL NFR BLD AUTO: 1 %
ERYTHROCYTE [DISTWIDTH] IN BLOOD BY AUTOMATED COUNT: 13.2 % (ref 10–15)
GLUCOSE SERPL-MCNC: 93 MG/DL (ref 70–99)
HCT VFR BLD AUTO: 36.3 % (ref 40–53)
HGB BLD-MCNC: 12 G/DL (ref 13.3–17.7)
IMM GRANULOCYTES # BLD: 0.2 10E3/UL
IMM GRANULOCYTES NFR BLD: 2 %
LYMPHOCYTES # BLD AUTO: 2.9 10E3/UL (ref 0.8–5.3)
LYMPHOCYTES NFR BLD AUTO: 28 %
MAGNESIUM SERPL-MCNC: 2.1 MG/DL (ref 1.7–2.3)
MCH RBC QN AUTO: 30.1 PG (ref 26.5–33)
MCHC RBC AUTO-ENTMCNC: 33.1 G/DL (ref 31.5–36.5)
MCV RBC AUTO: 91 FL (ref 78–100)
MONOCYTES # BLD AUTO: 0.7 10E3/UL (ref 0–1.3)
MONOCYTES NFR BLD AUTO: 7 %
NEUTROPHILS # BLD AUTO: 6.5 10E3/UL (ref 1.6–8.3)
NEUTROPHILS NFR BLD AUTO: 62 %
NRBC # BLD AUTO: 0 10E3/UL
NRBC BLD AUTO-RTO: 0 /100
PLATELET # BLD AUTO: 380 10E3/UL (ref 150–450)
POTASSIUM SERPL-SCNC: 3.9 MMOL/L (ref 3.4–5.3)
PROCALCITONIN SERPL IA-MCNC: 0.05 NG/ML
PROT SERPL-MCNC: 7.3 G/DL (ref 6.4–8.3)
RBC # BLD AUTO: 3.99 10E6/UL (ref 4.4–5.9)
SODIUM SERPL-SCNC: 136 MMOL/L (ref 136–145)
WBC # BLD AUTO: 10.5 10E3/UL (ref 4–11)

## 2023-09-09 PROCEDURE — 83735 ASSAY OF MAGNESIUM: CPT | Performed by: STUDENT IN AN ORGANIZED HEALTH CARE EDUCATION/TRAINING PROGRAM

## 2023-09-09 PROCEDURE — 80053 COMPREHEN METABOLIC PANEL: CPT | Performed by: STUDENT IN AN ORGANIZED HEALTH CARE EDUCATION/TRAINING PROGRAM

## 2023-09-09 PROCEDURE — 36415 COLL VENOUS BLD VENIPUNCTURE: CPT | Performed by: STUDENT IN AN ORGANIZED HEALTH CARE EDUCATION/TRAINING PROGRAM

## 2023-09-09 PROCEDURE — 250N000013 HC RX MED GY IP 250 OP 250 PS 637: Performed by: STUDENT IN AN ORGANIZED HEALTH CARE EDUCATION/TRAINING PROGRAM

## 2023-09-09 PROCEDURE — 85025 COMPLETE CBC W/AUTO DIFF WBC: CPT | Performed by: STUDENT IN AN ORGANIZED HEALTH CARE EDUCATION/TRAINING PROGRAM

## 2023-09-09 PROCEDURE — 99239 HOSP IP/OBS DSCHRG MGMT >30: CPT | Performed by: STUDENT IN AN ORGANIZED HEALTH CARE EDUCATION/TRAINING PROGRAM

## 2023-09-09 PROCEDURE — 250N000012 HC RX MED GY IP 250 OP 636 PS 637

## 2023-09-09 PROCEDURE — 250N000013 HC RX MED GY IP 250 OP 250 PS 637

## 2023-09-09 PROCEDURE — 250N000013 HC RX MED GY IP 250 OP 250 PS 637: Performed by: INTERNAL MEDICINE

## 2023-09-09 PROCEDURE — 84145 PROCALCITONIN (PCT): CPT | Performed by: STUDENT IN AN ORGANIZED HEALTH CARE EDUCATION/TRAINING PROGRAM

## 2023-09-09 RX ORDER — APIXABAN 5 MG (74)
KIT ORAL
Qty: 56 EACH | Refills: 0 | Status: SHIPPED | OUTPATIENT
Start: 2023-09-09 | End: 2023-10-07

## 2023-09-09 RX ORDER — GUAIFENESIN 600 MG/1
1200 TABLET, EXTENDED RELEASE ORAL 2 TIMES DAILY
Qty: 20 TABLET | Refills: 0 | Status: SHIPPED | OUTPATIENT
Start: 2023-09-09 | End: 2023-09-14

## 2023-09-09 RX ORDER — PANTOPRAZOLE SODIUM 40 MG/1
40 TABLET, DELAYED RELEASE ORAL DAILY
Qty: 30 TABLET | Refills: 0 | Status: SHIPPED | OUTPATIENT
Start: 2023-09-09 | End: 2023-09-09

## 2023-09-09 RX ORDER — AZITHROMYCIN 250 MG/1
500 TABLET, FILM COATED ORAL ONCE
Status: COMPLETED | OUTPATIENT
Start: 2023-09-09 | End: 2023-09-09

## 2023-09-09 RX ORDER — AZITHROMYCIN 250 MG/1
250 TABLET, FILM COATED ORAL DAILY
Qty: 4 TABLET | Refills: 0 | Status: SHIPPED | OUTPATIENT
Start: 2023-09-09 | End: 2023-09-13

## 2023-09-09 RX ORDER — DEXAMETHASONE 6 MG/1
6 TABLET ORAL DAILY
Qty: 7 TABLET | Refills: 0 | Status: SHIPPED | OUTPATIENT
Start: 2023-09-10 | End: 2023-09-17

## 2023-09-09 RX ADMIN — DEXAMETHASONE 6 MG: 2 TABLET ORAL at 08:27

## 2023-09-09 RX ADMIN — PANTOPRAZOLE SODIUM 40 MG: 40 TABLET, DELAYED RELEASE ORAL at 06:38

## 2023-09-09 RX ADMIN — AZITHROMYCIN DIHYDRATE 500 MG: 250 TABLET ORAL at 13:00

## 2023-09-09 RX ADMIN — APIXABAN 10 MG: 5 TABLET, FILM COATED ORAL at 08:26

## 2023-09-09 ASSESSMENT — ACTIVITIES OF DAILY LIVING (ADL)
ADLS_ACUITY_SCORE: 22
ADLS_ACUITY_SCORE: 20
ADLS_ACUITY_SCORE: 22
ADLS_ACUITY_SCORE: 22
ADLS_ACUITY_SCORE: 20

## 2023-09-09 NOTE — PROGRESS NOTES
Patient remains A & O X4  In beginning of shift with patient at 2300 last evening, patient was on ROOM AIR, saturation decreasing as patient slept, oxygen applied via NC at 1 L. To keep saturation above 91%.   Continues to deny pain.  Up independently in room.

## 2023-09-09 NOTE — DISCHARGE SUMMARY
[facility]  Hospitalist Discharge Summary      Date of Admission:  9/7/2023  Date of Discharge:  9/9/2023  Discharging Provider: John Danielson MD  Discharge Service: Hospitalist Service    Discharge Diagnoses   Acute hypoxic respiratory failure  COVID-19 infection  Pulmonary embolism multi segmental  Hyponatremia-improved  Leukocytosis-improved  Chronic anemia-stable  Neuropathic pains continue gabapentin  Hyperlipidemia  GERD    Clinically Significant Risk Factors     # Overweight: Estimated body mass index is 27.15 kg/m  as calculated from the following:    Height as of this encounter: 1.829 m (6').    Weight as of this encounter: 90.8 kg (200 lb 2.8 oz).       Follow-ups Needed After Discharge   Follow-up Appointments     Follow-up and recommended labs and tests       Follow up with primary care provider, Jourdan Reilly, within 7 days   for hospital follow- up.  No follow up labs or test are needed.    Follow up with Hematologist for Pulmonary  embolism        {Additional follow-up instructions/to-do's for PCP  : follow up with PCP and get referral to have hematology follow up for Pulm embolism     Unresulted Labs Ordered in the Past 30 Days of this Admission       No orders found from 8/8/2023 to 9/8/2023.        These results will be followed up by PCP     Discharge Disposition   Discharged to home  Condition at discharge: Stable    Hospital Course   Gildardo Almeida is a 73 year old male with past medical history of colon polyps, colon adenoma now presents on 9/7/2023 with dyspnea.     Acute hypoxic respiratory failure  COVID-19 infection  Pulmonary embolism multi segmental  -Started on apixaban  -Dexamethasone and remdesivir, po dexa on discontinue   - on room air at rest and ambulation   Hyponatremia   -improved   Leukocytosis-improved    Chronic anemia  -Hgb remained stable we will continue anticoagulation    Neuropathic pain  Patient reports he takes gabapentin 400 mg at  bedtime, continue.     Hyperlipidemia  Continue PTA atorvastatin 20 mg at bedtime     GERD  Continue PTA PPI.    Consultations This Hospital Stay   None    Code Status   No Order    Time Spent on this Encounter   I, John Danielson MD, personally saw the patient today and spent greater than 30 minutes discharging this patient.       John Danielson MD  [facility] SURG[facility]  [address] [address]  Phone: [phone]  Fax: [phone]  ______________________________________________________________________    Physical Exam   Vital Signs: Temp: 97.4  F (36.3  C) Temp src: Oral BP: 129/64 Pulse: 80   Resp: 16 SpO2: 94 % O2 Device: None (Room air) Oxygen Delivery: 1.5 LPM  Weight: 200 lbs 2.84 oz  Constitutional comfortable on room air   Eyes: Eyes are clear  HENT: Oropharynx is clear and moist. No evidence of cranial trauma.  Cardiovascular: Regular rate and rhythm, normal S1 and S2, and no murmur noted. Good peripheral pulses in wrists bilaterally. Trace pitting LE edema in ankles bilaterally. Tamiko sign negative.   Respiratory: Bilateral comparable air entry no crackles or wheezes.   GI: Soft, non-tender, normal bowel sounds, nondistended.   Musculoskeletal: Normal muscle bulk, no obvious joint deformities.   Skin: Warm and dry, no rashes.   Neurologic: Neck supple.  is symmetric. Alert, oriented, interacting appropriately.       Primary Care Physician   Jourdan Reilly    Discharge Orders      Reason for your hospital stay    Pulmonary embolism , covid 19 infection , hypoxia     Follow-up and recommended labs and tests     Follow up with primary care provider, Jourdan Reilly, within 7 days for hospital follow- up.  No follow up labs or test are needed.    Follow up with Hematologist for Pulmonary  embolism     Activity    Your activity upon discharge: activity as tolerated     Diet    Follow this diet upon discharge: Orders Placed This Encounter      Regular Diet Adult        Significant Results and Procedures   Results for orders placed or performed during the hospital encounter of 09/07/23   CT Chest Pulmonary Embolism w Contrast     Value    Radiologist flags Pulmonary embolism (AA)    Narrative    CT CHEST PULMONARY EMBOLISM W CONTRAST 9/7/2023 3:20 PM    CLINICAL HISTORY: short of breath, hypoxia, positive covid-19,  elevated d-dimer  TECHNIQUE: CT angiogram chest during arterial phase injection IV  contrast. 2D and 3D MIP reconstructions were performed by the CT  technologist. Dose reduction techniques were used.     CONTRAST: 86 mL Isovue 370    COMPARISON: CT 6/16/2006    FINDINGS:  ANGIOGRAM CHEST: Pulmonary arteries are normal caliber with small  volume segmental and subsegmental pulmonary emboli in the right middle  and lower lobes. Thoracic aorta is not well opacified and is  indeterminate for dissection. Aneurysmal dilation of the descending  thoracic aorta measuring up to 4.1 cm in diameter (series 4 image  117). No CT evidence of right heart strain.    LUNGS AND PLEURA: Upper lobe predominant paraseptal emphysema with  some superimposed peripheral interstitial opacities and consolidation,  some of which have some likely traction bronchiectasis associated.  Large calcified granuloma in the right lower lobe. No significant  pleural effusion.    MEDIASTINUM/AXILLAE: Aneurysmal dilation of the descending thoracic  aorta as above. Calcified subcarinal and right hilar lymph nodes,  likely related to prior granulomatous disease. Mildly enlarged  noncalcified mediastinal lymph nodes, most likely reactive due to  adjacent inflammation, no specific follow-up recommended. Small  calcified right thyroid nodule, no specific follow-up recommended  given size.    CORONARY ARTERY CALCIFICATION: None.    UPPER ABDOMEN: Simple appearing right hepatic cyst, no specific  follow-up recommended. 1.6 cm right adrenal nodule.    MUSCULOSKELETAL: No acute bony abnormality.      Impression     IMPRESSION:  1.  Small volume segmental and subsegmental pulmonary emboli in the  right middle and lower lobes, without evidence of right heart strain.  2.  Mild emphysema with peripheral interstitial and alveolar opacities  and some associated traction bronchiectasis, findings likely represent  Covid pneumonia given recent positive test but superimposed fibrosis  is also possible. Consider pulmonology referral.  3.  Aneurysmal dilation of the descending thoracic aorta measuring up  to 4.1 cm.  4.  Incidental 1.6 cm right adrenal nodule, statistically represents  an adenoma, consider biochemical workup and follow-up imaging in one  year to document size stability.      [Critical Result: Pulmonary embolism]    Finding was identified on 9/7/2023 3:24 PM.     Dr. Harris was contacted by me on 9/7/2023 3:37 PM and verbalized  understanding of the critical result.     JORGE BULLOCK MD         SYSTEM ID:  [id number]   XR Chest Port 1 View    Narrative    EXAM: XR CHEST PORT 1 VIEW  LOCATION: [facility]  DATE: 9/8/2023    INDICATION: Crackles in bases  COMPARISON: Chest CT on 09/07/2023      Impression    IMPRESSION: Mildly coarsened pulmonary interstitium in the bilateral lung bases and in the right upper lobe, stable to slightly decreased from prior CT, although direct comparison is difficult due to modality differences. A calcified granuloma again   noted in the right lower lobe. Mild focal bronchiectasis in the medial right lung base indicates underlying pulmonary fibrosis. No new large confluent consolidations, pleural effusion or pneumothorax. Heart size and pulmonary vascularity are within   normal limits.       Discharge Medications   Current Discharge Medication List        START taking these medications    Details   Apixaban Starter Pack (ELIQUIS DVT/PE STARTER PACK) 5 MG TBPK Take 10 mg by mouth 2 times daily for 5 days, THEN 5 mg 2 times daily for 23 days.  Qty: 56 each, Refills: 0     Associated Diagnoses: Multiple subsegmental pulmonary emboli without acute cor pulmonale (H)      azithromycin (ZITHROMAX) 250 MG tablet Take 1 tablet (250 mg) by mouth daily for 4 days  Qty: 4 tablet, Refills: 0    Associated Diagnoses: Pneumonia due to 2019 novel coronavirus      dexAMETHasone (DECADRON) 6 MG tablet Take 1 tablet (6 mg) by mouth daily for 7 days  Qty: 7 tablet, Refills: 0    Associated Diagnoses: Pneumonia due to 2019 novel coronavirus      guaiFENesin (MUCINEX) 600 MG 12 hr tablet Take 2 tablets (1,200 mg) by mouth 2 times daily for 5 days  Qty: 20 tablet, Refills: 0    Associated Diagnoses: Pneumonia due to 2019 novel coronavirus      pantoprazole (PROTONIX) 40 MG EC tablet Take 1 tablet (40 mg) by mouth daily for 30 days  Qty: 30 tablet, Refills: 0    Associated Diagnoses: Multiple subsegmental pulmonary emboli without acute cor pulmonale (H)           CONTINUE these medications which have NOT CHANGED    Details   albuterol (PROAIR HFA/PROVENTIL HFA/VENTOLIN HFA) 108 (90 Base) MCG/ACT inhaler Inhale 2 puffs into the lungs every 6 hours as needed for shortness of breath, wheezing or cough  Qty: 18 g, Refills: 0    Comments: Pharmacy may dispense brand covered by insurance (Proair, or proventil or ventolin or generic albuterol inhaler)      atorvastatin (LIPITOR) 20 MG tablet Take 1 tablet by mouth At Bedtime      Cyanocobalamin (B-12) 50 MCG TABS Take 1 tablet by mouth daily      gabapentin (NEURONTIN) 400 MG capsule Take 400 mg by mouth 2 times daily      loratadine (CLARITIN) 10 MG tablet Take 10 mg by mouth daily      OMEPRAZOLE PO Take 20 mg by mouth every morning           STOP taking these medications       benzonatate (TESSALON) 200 MG capsule Comments:   Reason for Stopping:             Allergies   No Known Allergies

## 2023-09-09 NOTE — PROGRESS NOTES
Telehospitalist Cross-cover    Patient with some crackles in his lung bases and also some new edema in his feet. No history of CHF and he is not hypoxic.   - check Chest X-ray  - check echocardiogram    Eloy De Los Santos MD  Telehospitalist

## 2023-09-09 NOTE — DISCHARGE INSTRUCTIONS
Please follow up with Hematologist as out patient ; please discuss with your primary care provider to get referral for hematology follow up for your blood clots.     You have a follow up appt with Dr. Reilly at [facility] on Tuesday 9/12/23 At  2:40p.m. Please call if  you are unable to make this appt. @ [phone].    Dr. Danielson also want you to have an Echocardiogram, they should call you to set this up. If they have NOT called by 9/12/23, Please call [phone] to schedule this appt.

## 2023-09-09 NOTE — PROGRESS NOTES
Patient was assessed in the morning rounds was on room air denied any shortness of breath but has occasional cough  On physical exam was on room air, chest was clear to auscultation bilaterally  Upon ambulation 2 minutes patient noted to be saturating above 90-91%; will discharge the patient and outpatient follow-up with PCP and hematology

## 2023-09-09 NOTE — PLAN OF CARE
[facility] DISCHARGE NOTE    Patient discharged to home at 1:26 PM via wheel chair. Accompanied by spouse and staff. Discharge instructions reviewed with patient and spouse, opportunity offered to ask questions. Prescriptions sent to patients preferred pharmacy. All belongings sent with patient.    Sury Jones RN

## 2023-09-09 NOTE — PROGRESS NOTES
Writer walked patient in room at least 100 feet and patient 02 sats stayed greater than 93% on RA. Was provided information from CDC and [place] Guidelines on isolation precautions.   Web page to Dr Danielson regarding walking oximetry results.

## 2023-09-09 NOTE — PROGRESS NOTES
Patient alert and oriented. Vitals stable. O2 weaned from 1.5 LPM via nasal cannula; currently 91 - 96% on room air. Denies pain. Independent in room. On telemetry.  Coarse crackles in bilateral bases increasing throughout this shift, although patient denies shortness of breath and O2 needs have been decreasing. Telemed provider notified and chest X-ray ordered. Per provider, small amount of effusion in bases - monitor for now. Good appetite. Voiding spontaneously. PIV saline locked.    Temp: 98  F (36.7  C) Temp src: Oral BP: 129/78 Pulse: 80   Resp: 16 SpO2: 93 % O2 Device: None (Room air)

## 2023-09-09 NOTE — PROGRESS NOTES
Resp: Patient dyspnic with activity, loose non-productive cough. Using incentive spirometer independently. Patient up to walk in room about 20 feet and 02 sats on RA 92%. LS coarse crackles bases/mid lobe.  No edema noted RLE, LLE trace edema in left foot and left ankle. Weight obtained and 200#. ( Down from stated weight of 210# on admit).  Reports he would like to go home today. Noted that an echocardiogram ordered for Monday, patient informed of this.

## 2023-09-12 ENCOUNTER — PATIENT OUTREACH (OUTPATIENT)
Dept: CARE COORDINATION | Facility: CLINIC | Age: 73
End: 2023-09-12
Payer: COMMERCIAL

## 2023-09-12 NOTE — PROGRESS NOTES
[facility]    Background: Transitional Care Management program identified per system criteria and reviewed by [facility] team for possible outreach.    Assessment: Upon chart review, [facility] Team member will not proceed with patient outreach related to this episode of Transitional Care Management program due to reason below:    Patient has a follow up appointment with an appropriate provider today for hospital discharge    Patient has a follow-up appointment today with their PCP in Family Medicine from [facility]. W chart review second outreach only.    Plan: Transitional Care Management episode addressed appropriately per reason noted above.      ARIELA Tomlinson  [phone]  [facility]     *Connected Care Resource Team does NOT follow patient ongoing. Referrals are identified based on internal discharge reports and the outreach is to ensure patient has an understanding of their discharge instructions.